# Patient Record
Sex: MALE | Race: BLACK OR AFRICAN AMERICAN | Employment: FULL TIME | URBAN - METROPOLITAN AREA
[De-identification: names, ages, dates, MRNs, and addresses within clinical notes are randomized per-mention and may not be internally consistent; named-entity substitution may affect disease eponyms.]

---

## 2024-07-21 ENCOUNTER — HOSPITAL ENCOUNTER (INPATIENT)
Facility: HOSPITAL | Age: 54
LOS: 3 days | Discharge: HOME | End: 2024-07-25
Attending: EMERGENCY MEDICINE | Admitting: HOSPITALIST

## 2024-07-21 ENCOUNTER — APPOINTMENT (OUTPATIENT)
Dept: CT IMAGING | Facility: HOSPITAL | Age: 54
End: 2024-07-21
Attending: EMERGENCY MEDICINE

## 2024-07-21 ENCOUNTER — APPOINTMENT (OUTPATIENT)
Dept: GENERAL RADIOLOGY | Facility: HOSPITAL | Age: 54
End: 2024-07-21
Attending: EMERGENCY MEDICINE

## 2024-07-21 DIAGNOSIS — I48.91 ATRIAL FIBRILLATION WITH RAPID VENTRICULAR RESPONSE (HCC): Primary | ICD-10-CM

## 2024-07-21 DIAGNOSIS — N17.9 AKI (ACUTE KIDNEY INJURY) (HCC): ICD-10-CM

## 2024-07-21 DIAGNOSIS — J90 PLEURAL EFFUSION: ICD-10-CM

## 2024-07-21 DIAGNOSIS — R79.89 ELEVATED TROPONIN: ICD-10-CM
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
## 2024-07-21 LAB
ALBUMIN SERPL-MCNC: 3.6 G/DL (ref 3.2–4.8)
ALBUMIN/GLOB SERPL: 1.2 {RATIO} (ref 1–2)
ALP LIVER SERPL-CCNC: 108 U/L
ALT SERPL-CCNC: 35 U/L
ANION GAP SERPL CALC-SCNC: 11 MMOL/L (ref 0–18)
AST SERPL-CCNC: 33 U/L (ref ?–34)
BASOPHILS # BLD AUTO: 0.05 X10(3) UL (ref 0–0.2)
BASOPHILS NFR BLD AUTO: 0.5 %
BILIRUB SERPL-MCNC: 0.5 MG/DL (ref 0.3–1.2)
BUN BLD-MCNC: 31 MG/DL (ref 9–23)
BUN/CREAT SERPL: 10.2 (ref 10–20)
CALCIUM BLD-MCNC: 8.7 MG/DL (ref 8.7–10.4)
CHLORIDE SERPL-SCNC: 112 MMOL/L (ref 98–112)
CHOLEST SERPL-MCNC: 206 MG/DL (ref ?–200)
CO2 SERPL-SCNC: 20 MMOL/L (ref 21–32)
CREAT BLD-MCNC: 3.04 MG/DL
DEPRECATED RDW RBC AUTO: 40 FL (ref 35.1–46.3)
EGFRCR SERPLBLD CKD-EPI 2021: 23 ML/MIN/1.73M2 (ref 60–?)
EOSINOPHIL # BLD AUTO: 0.06 X10(3) UL (ref 0–0.7)
EOSINOPHIL NFR BLD AUTO: 0.6 %
ERYTHROCYTE [DISTWIDTH] IN BLOOD BY AUTOMATED COUNT: 13.3 % (ref 11–15)
GLOBULIN PLAS-MCNC: 3.1 G/DL (ref 2–3.5)
GLUCOSE BLD-MCNC: 220 MG/DL (ref 70–99)
GLUCOSE BLDC GLUCOMTR-MCNC: 206 MG/DL (ref 70–99)
HCT VFR BLD AUTO: 41 %
HDLC SERPL-MCNC: 50 MG/DL (ref 40–59)
HGB BLD-MCNC: 13.2 G/DL
IMM GRANULOCYTES # BLD AUTO: 0.04 X10(3) UL (ref 0–1)
IMM GRANULOCYTES NFR BLD: 0.4 %
LDLC SERPL CALC-MCNC: 137 MG/DL (ref ?–100)
LYMPHOCYTES # BLD AUTO: 1.76 X10(3) UL (ref 1–4)
LYMPHOCYTES NFR BLD AUTO: 18.6 %
MAGNESIUM SERPL-MCNC: 1.7 MG/DL (ref 1.6–2.6)
MCH RBC QN AUTO: 26.6 PG (ref 26–34)
MCHC RBC AUTO-ENTMCNC: 32.2 G/DL (ref 31–37)
MCV RBC AUTO: 82.5 FL
MONOCYTES # BLD AUTO: 0.97 X10(3) UL (ref 0.1–1)
MONOCYTES NFR BLD AUTO: 10.2 %
NEUTROPHILS # BLD AUTO: 6.59 X10 (3) UL (ref 1.5–7.7)
NEUTROPHILS # BLD AUTO: 6.59 X10(3) UL (ref 1.5–7.7)
NEUTROPHILS NFR BLD AUTO: 69.7 %
NONHDLC SERPL-MCNC: 156 MG/DL (ref ?–130)
OSMOLALITY SERPL CALC.SUM OF ELEC: 309 MOSM/KG (ref 275–295)
PLATELET # BLD AUTO: 296 10(3)UL (ref 150–450)
POTASSIUM SERPL-SCNC: 4 MMOL/L (ref 3.5–5.1)
PROT SERPL-MCNC: 6.7 G/DL (ref 5.7–8.2)
RBC # BLD AUTO: 4.97 X10(6)UL
SODIUM SERPL-SCNC: 143 MMOL/L (ref 136–145)
TRIGL SERPL-MCNC: 107 MG/DL (ref 30–149)
TROPONIN I SERPL HS-MCNC: 510 NG/L
VLDLC SERPL CALC-MCNC: 20 MG/DL (ref 0–30)
WBC # BLD AUTO: 9.5 X10(3) UL (ref 4–11)

## 2024-07-21 PROCEDURE — 71275 CT ANGIOGRAPHY CHEST: CPT | Performed by: EMERGENCY MEDICINE

## 2024-07-21 PROCEDURE — 99223 1ST HOSP IP/OBS HIGH 75: CPT | Performed by: HOSPITALIST

## 2024-07-21 PROCEDURE — 71045 X-RAY EXAM CHEST 1 VIEW: CPT | Performed by: EMERGENCY MEDICINE

## 2024-07-21 PROCEDURE — 74175 CTA ABDOMEN W/CONTRAST: CPT | Performed by: EMERGENCY MEDICINE

## 2024-07-21 RX ORDER — DILTIAZEM HYDROCHLORIDE 5 MG/ML
INJECTION INTRAVENOUS
Status: COMPLETED
Start: 2024-07-21 | End: 2024-07-21

## 2024-07-21 RX ORDER — DILTIAZEM HYDROCHLORIDE 5 MG/ML
10 INJECTION INTRAVENOUS ONCE
Status: COMPLETED | OUTPATIENT
Start: 2024-07-21 | End: 2024-07-21

## 2024-07-22 ENCOUNTER — APPOINTMENT (OUTPATIENT)
Dept: CV DIAGNOSTICS | Facility: HOSPITAL | Age: 54
End: 2024-07-22
Attending: HOSPITALIST
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
## 2024-07-22 PROBLEM — J90 PLEURAL EFFUSION: Status: ACTIVE | Noted: 2024-07-22

## 2024-07-22 PROBLEM — N17.9 AKI (ACUTE KIDNEY INJURY) (HCC): Status: ACTIVE | Noted: 2024-07-22

## 2024-07-22 PROBLEM — R79.89 ELEVATED TROPONIN: Status: ACTIVE | Noted: 2024-07-22

## 2024-07-22 LAB
ALBUMIN SERPL-MCNC: 3.4 G/DL (ref 3.2–4.8)
ALP LIVER SERPL-CCNC: 110 U/L
ALT SERPL-CCNC: 39 U/L
ANION GAP SERPL CALC-SCNC: 6 MMOL/L (ref 0–18)
APTT PPP: 27 SECONDS (ref 23–36)
APTT PPP: 37.2 SECONDS (ref 23–36)
APTT PPP: 40.5 SECONDS (ref 23–36)
APTT PPP: 47.4 SECONDS (ref 23–36)
AST SERPL-CCNC: 32 U/L (ref ?–34)
ATRIAL RATE: 79 BPM
BASOPHILS # BLD AUTO: 0.04 X10(3) UL (ref 0–0.2)
BASOPHILS NFR BLD AUTO: 0.4 %
BILIRUB DIRECT SERPL-MCNC: 0.1 MG/DL (ref ?–0.3)
BILIRUB SERPL-MCNC: 0.4 MG/DL (ref 0.3–1.2)
BILIRUB UR QL: NEGATIVE
BNP SERPL-MCNC: 664 PG/ML
BUN BLD-MCNC: 29 MG/DL (ref 9–23)
BUN/CREAT SERPL: 9.1 (ref 10–20)
CALCIUM BLD-MCNC: 8.5 MG/DL (ref 8.7–10.4)
CHLORIDE SERPL-SCNC: 112 MMOL/L (ref 98–112)
CK SERPL-CCNC: 1122 U/L
CK SERPL-CCNC: 1405 U/L
CO2 SERPL-SCNC: 24 MMOL/L (ref 21–32)
CREAT BLD-MCNC: 3.19 MG/DL
CREAT UR-SCNC: 116.9 MG/DL
CREAT UR-SCNC: 116.9 MG/DL
DEPRECATED RDW RBC AUTO: 40.1 FL (ref 35.1–46.3)
EGFRCR SERPLBLD CKD-EPI 2021: 22 ML/MIN/1.73M2 (ref 60–?)
EOSINOPHIL # BLD AUTO: 0.02 X10(3) UL (ref 0–0.7)
EOSINOPHIL NFR BLD AUTO: 0.2 %
ERYTHROCYTE [DISTWIDTH] IN BLOOD BY AUTOMATED COUNT: 13.4 % (ref 11–15)
EST. AVERAGE GLUCOSE BLD GHB EST-MCNC: 235 MG/DL (ref 68–126)
GLUCOSE BLD-MCNC: 201 MG/DL (ref 70–99)
GLUCOSE BLDC GLUCOMTR-MCNC: 150 MG/DL (ref 70–99)
GLUCOSE BLDC GLUCOMTR-MCNC: 160 MG/DL (ref 70–99)
GLUCOSE BLDC GLUCOMTR-MCNC: 162 MG/DL (ref 70–99)
GLUCOSE BLDC GLUCOMTR-MCNC: 171 MG/DL (ref 70–99)
GLUCOSE BLDC GLUCOMTR-MCNC: 195 MG/DL (ref 70–99)
GLUCOSE BLDC GLUCOMTR-MCNC: 202 MG/DL (ref 70–99)
GLUCOSE BLDC GLUCOMTR-MCNC: 237 MG/DL (ref 70–99)
GLUCOSE UR-MCNC: 150 MG/DL
GRAN CASTS #/AREA URNS LPF: PRESENT /LPF
HBA1C MFR BLD: 9.8 % (ref ?–5.7)
HCT VFR BLD AUTO: 36 %
HGB BLD-MCNC: 11.9 G/DL
HYALINE CASTS #/AREA URNS AUTO: PRESENT /LPF
IMM GRANULOCYTES # BLD AUTO: 0.02 X10(3) UL (ref 0–1)
IMM GRANULOCYTES NFR BLD: 0.2 %
KETONES UR-MCNC: NEGATIVE MG/DL
LEUKOCYTE ESTERASE UR QL STRIP.AUTO: NEGATIVE
LYMPHOCYTES # BLD AUTO: 1.67 X10(3) UL (ref 1–4)
LYMPHOCYTES NFR BLD AUTO: 17.6 %
MCH RBC QN AUTO: 27.2 PG (ref 26–34)
MCHC RBC AUTO-ENTMCNC: 33.1 G/DL (ref 31–37)
MCV RBC AUTO: 82.2 FL
MICROALBUMIN UR-MCNC: >380 MG/DL
MONOCYTES # BLD AUTO: 1.06 X10(3) UL (ref 0.1–1)
MONOCYTES NFR BLD AUTO: 11.2 %
NEUTROPHILS # BLD AUTO: 6.68 X10 (3) UL (ref 1.5–7.7)
NEUTROPHILS # BLD AUTO: 6.68 X10(3) UL (ref 1.5–7.7)
NEUTROPHILS NFR BLD AUTO: 70.4 %
NITRITE UR QL STRIP.AUTO: NEGATIVE
OSMOLALITY SERPL CALC.SUM OF ELEC: 306 MOSM/KG (ref 275–295)
P AXIS: 62 DEGREES
P-R INTERVAL: 150 MS
PH UR: 6 [PH] (ref 5–8)
PHOSPHATE SERPL-MCNC: 4.8 MG/DL (ref 2.4–5.1)
PLATELET # BLD AUTO: 252 10(3)UL (ref 150–450)
POTASSIUM SERPL-SCNC: 4.6 MMOL/L (ref 3.5–5.1)
PROT SERPL-MCNC: 6.5 G/DL (ref 5.7–8.2)
PROT UR-MCNC: 600 MG/DL
PROT UR-MCNC: 805.8 MG/DL (ref ?–14)
PROT/CREAT UR-RTO: 6.89
Q-T INTERVAL: 320 MS
Q-T INTERVAL: 400 MS
QRS DURATION: 86 MS
QRS DURATION: 90 MS
QTC CALCULATION (BEZET): 458 MS
QTC CALCULATION (BEZET): 522 MS
R AXIS: -32 DEGREES
R AXIS: -34 DEGREES
RBC # BLD AUTO: 4.38 X10(6)UL
SODIUM SERPL-SCNC: 142 MMOL/L (ref 136–145)
SP GR UR STRIP: 1.03 (ref 1–1.03)
T AXIS: 117 DEGREES
T AXIS: 167 DEGREES
TROPONIN I SERPL HS-MCNC: 783 NG/L
TSI SER-ACNC: 4.5 MIU/ML (ref 0.55–4.78)
UROBILINOGEN UR STRIP-ACNC: NORMAL
VENTRICULAR RATE: 160 BPM
VENTRICULAR RATE: 79 BPM
WBC # BLD AUTO: 9.5 X10(3) UL (ref 4–11)

## 2024-07-22 PROCEDURE — 99255 IP/OBS CONSLTJ NEW/EST HI 80: CPT | Performed by: INTERNAL MEDICINE

## 2024-07-22 PROCEDURE — 93306 TTE W/DOPPLER COMPLETE: CPT | Performed by: HOSPITALIST

## 2024-07-22 PROCEDURE — 99233 SBSQ HOSP IP/OBS HIGH 50: CPT | Performed by: INTERNAL MEDICINE

## 2024-07-22 PROCEDURE — 99222 1ST HOSP IP/OBS MODERATE 55: CPT | Performed by: INTERNAL MEDICINE

## 2024-07-22 RX ORDER — ZOLPIDEM TARTRATE 5 MG/1
5 TABLET ORAL NIGHTLY PRN
Status: DISCONTINUED | OUTPATIENT
Start: 2024-07-22 | End: 2024-07-25

## 2024-07-22 RX ORDER — SODIUM CHLORIDE 450 MG/100ML
INJECTION, SOLUTION INTRAVENOUS CONTINUOUS
Status: DISCONTINUED | OUTPATIENT
Start: 2024-07-22 | End: 2024-07-22

## 2024-07-22 RX ORDER — DILTIAZEM HYDROCHLORIDE 5 MG/ML
10 INJECTION INTRAVENOUS ONCE
Status: COMPLETED | OUTPATIENT
Start: 2024-07-22 | End: 2024-07-22

## 2024-07-22 RX ORDER — METOPROLOL SUCCINATE 50 MG/1
50 TABLET, EXTENDED RELEASE ORAL
Status: DISCONTINUED | OUTPATIENT
Start: 2024-07-22 | End: 2024-07-25

## 2024-07-22 RX ORDER — INSULIN DEGLUDEC 100 U/ML
10 INJECTION, SOLUTION SUBCUTANEOUS DAILY
Status: DISCONTINUED | OUTPATIENT
Start: 2024-07-22 | End: 2024-07-22

## 2024-07-22 RX ORDER — NICOTINE POLACRILEX 4 MG
30 LOZENGE BUCCAL
Status: DISCONTINUED | OUTPATIENT
Start: 2024-07-22 | End: 2024-07-25

## 2024-07-22 RX ORDER — HEPARIN SODIUM 1000 [USP'U]/ML
3000 INJECTION, SOLUTION INTRAVENOUS; SUBCUTANEOUS ONCE
Status: COMPLETED | OUTPATIENT
Start: 2024-07-22 | End: 2024-07-22

## 2024-07-22 RX ORDER — MORPHINE SULFATE 2 MG/ML
2 INJECTION, SOLUTION INTRAMUSCULAR; INTRAVENOUS EVERY 2 HOUR PRN
Status: DISCONTINUED | OUTPATIENT
Start: 2024-07-22 | End: 2024-07-25

## 2024-07-22 RX ORDER — HEPARIN SODIUM AND DEXTROSE 10000; 5 [USP'U]/100ML; G/100ML
1000 INJECTION INTRAVENOUS ONCE
Status: COMPLETED | OUTPATIENT
Start: 2024-07-22 | End: 2024-07-22

## 2024-07-22 RX ORDER — PANTOPRAZOLE SODIUM 40 MG/1
40 TABLET, DELAYED RELEASE ORAL
Status: DISCONTINUED | OUTPATIENT
Start: 2024-07-22 | End: 2024-07-25

## 2024-07-22 RX ORDER — LISINOPRIL 20 MG/1
20 TABLET ORAL DAILY
COMMUNITY
End: 2024-07-25

## 2024-07-22 RX ORDER — ACETAMINOPHEN 325 MG/1
650 TABLET ORAL EVERY 6 HOURS PRN
Status: DISCONTINUED | OUTPATIENT
Start: 2024-07-22 | End: 2024-07-22

## 2024-07-22 RX ORDER — ONDANSETRON 2 MG/ML
4 INJECTION INTRAMUSCULAR; INTRAVENOUS EVERY 6 HOURS PRN
Status: DISCONTINUED | OUTPATIENT
Start: 2024-07-22 | End: 2024-07-25

## 2024-07-22 RX ORDER — HEPARIN SODIUM AND DEXTROSE 10000; 5 [USP'U]/100ML; G/100ML
INJECTION INTRAVENOUS CONTINUOUS
Status: DISCONTINUED | OUTPATIENT
Start: 2024-07-22 | End: 2024-07-25

## 2024-07-22 RX ORDER — HYDRALAZINE HYDROCHLORIDE 20 MG/ML
10 INJECTION INTRAMUSCULAR; INTRAVENOUS EVERY 4 HOURS PRN
Status: DISCONTINUED | OUTPATIENT
Start: 2024-07-22 | End: 2024-07-25

## 2024-07-22 RX ORDER — METFORMIN HYDROCHLORIDE EXTENDED-RELEASE TABLETS 500 MG/1
500 TABLET, FILM COATED, EXTENDED RELEASE ORAL 2 TIMES DAILY WITH MEALS
COMMUNITY
End: 2024-07-25

## 2024-07-22 RX ORDER — MAGNESIUM OXIDE 400 MG/1
400 TABLET ORAL ONCE
Status: COMPLETED | OUTPATIENT
Start: 2024-07-22 | End: 2024-07-22

## 2024-07-22 RX ORDER — ONDANSETRON 2 MG/ML
4 INJECTION INTRAMUSCULAR; INTRAVENOUS ONCE
Status: COMPLETED | OUTPATIENT
Start: 2024-07-22 | End: 2024-07-22

## 2024-07-22 RX ORDER — MORPHINE SULFATE 4 MG/ML
4 INJECTION, SOLUTION INTRAMUSCULAR; INTRAVENOUS EVERY 2 HOUR PRN
Status: DISCONTINUED | OUTPATIENT
Start: 2024-07-22 | End: 2024-07-25

## 2024-07-22 RX ORDER — FUROSEMIDE 10 MG/ML
40 INJECTION INTRAMUSCULAR; INTRAVENOUS ONCE
Status: COMPLETED | OUTPATIENT
Start: 2024-07-22 | End: 2024-07-22

## 2024-07-22 RX ORDER — DEXTROSE MONOHYDRATE 25 G/50ML
50 INJECTION, SOLUTION INTRAVENOUS
Status: DISCONTINUED | OUTPATIENT
Start: 2024-07-22 | End: 2024-07-25

## 2024-07-22 RX ORDER — HEPARIN SODIUM 1000 [USP'U]/ML
5000 INJECTION, SOLUTION INTRAVENOUS; SUBCUTANEOUS ONCE
Status: COMPLETED | OUTPATIENT
Start: 2024-07-22 | End: 2024-07-22

## 2024-07-22 RX ORDER — ACETAMINOPHEN 500 MG
1000 TABLET ORAL ONCE
Status: COMPLETED | OUTPATIENT
Start: 2024-07-22 | End: 2024-07-22

## 2024-07-22 RX ORDER — NICOTINE POLACRILEX 4 MG
15 LOZENGE BUCCAL
Status: DISCONTINUED | OUTPATIENT
Start: 2024-07-22 | End: 2024-07-25

## 2024-07-22 RX ORDER — MAGNESIUM HYDROXIDE/ALUMINUM HYDROXICE/SIMETHICONE 120; 1200; 1200 MG/30ML; MG/30ML; MG/30ML
30 SUSPENSION ORAL 4 TIMES DAILY PRN
Status: DISCONTINUED | OUTPATIENT
Start: 2024-07-22 | End: 2024-07-25

## 2024-07-22 RX ORDER — LABETALOL HYDROCHLORIDE 5 MG/ML
10 INJECTION, SOLUTION INTRAVENOUS EVERY 4 HOURS PRN
Status: DISCONTINUED | OUTPATIENT
Start: 2024-07-22 | End: 2024-07-25

## 2024-07-22 RX ORDER — HYDRALAZINE HYDROCHLORIDE 20 MG/ML
10 INJECTION INTRAMUSCULAR; INTRAVENOUS ONCE
Status: COMPLETED | OUTPATIENT
Start: 2024-07-22 | End: 2024-07-22

## 2024-07-22 RX ORDER — HYDROCODONE BITARTRATE AND ACETAMINOPHEN 5; 325 MG/1; MG/1
1 TABLET ORAL EVERY 6 HOURS PRN
Status: DISCONTINUED | OUTPATIENT
Start: 2024-07-22 | End: 2024-07-25

## 2024-07-22 RX ORDER — ONDANSETRON 2 MG/ML
INJECTION INTRAMUSCULAR; INTRAVENOUS
Status: COMPLETED
Start: 2024-07-22 | End: 2024-07-22

## 2024-07-22 RX ORDER — INSULIN DEGLUDEC 100 U/ML
15 INJECTION, SOLUTION SUBCUTANEOUS DAILY
Status: DISCONTINUED | OUTPATIENT
Start: 2024-07-23 | End: 2024-07-25
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
## 2024-07-22 NOTE — PLAN OF CARE
Patient alert & oriented x4. Patient NSR on monitor. Heparin drip infusing. Family at bedside. Patient and family updated on POC and verbalized understanding.     Problem: Patient Centered Care  Goal: Patient preferences are identified and integrated in the patient's plan of care  Description: Interventions:  - What would you like us to know as we care for you? Im from home   - Provide timely, complete, and accurate information to patient/family  - Incorporate patient and family knowledge, values, beliefs, and cultural backgrounds into the planning and delivery of care  - Encourage patient/family to participate in care and decision-making at the level they choose  - Honor patient and family perspectives and choices  Outcome: Progressing     Problem: Patient/Family Goals  Goal: Patient/Family Long Term Goal  Description: Patient's Long Term Goal: To go home    Interventions:  -Cardiology, nephrology, and endocrinology consults  - Further testing  - Follow providers recommendations  - See additional Care Plan goals for specific interventions  Outcome: Progressing  Goal: Patient/Family Short Term Goal  Description: Patient's Short Term Goal: Keep my heart in regular rhythm    Interventions:   - Cardiology consult  - Further testing  - Follow provider recommendations  - See additional Care Plan goals for specific interventions  Outcome: Progressing     Problem: CARDIOVASCULAR - ADULT  Goal: Maintains optimal cardiac output and hemodynamic stability  Description: INTERVENTIONS:  - Monitor vital signs, rhythm, and trends  - Monitor for bleeding, hypotension and signs of decreased cardiac output  - Evaluate effectiveness of vasoactive medications to optimize hemodynamic stability  - Monitor arterial and/or venous puncture sites for bleeding and/or hematoma  - Assess quality of pulses, skin color and temperature  - Assess for signs of decreased coronary artery perfusion - ex. Angina  - Evaluate fluid balance, assess for  edema, trend weights  Outcome: Progressing  Goal: Absence of cardiac arrhythmias or at baseline  Description: INTERVENTIONS:  - Continuous cardiac monitoring, monitor vital signs, obtain 12 lead EKG if indicated  - Evaluate effectiveness of antiarrhythmic and heart rate control medications as ordered  - Initiate emergency measures for life threatening arrhythmias  - Monitor electrolytes and administer replacement therapy as ordered  Outcome: Progressing
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
## 2024-07-22 NOTE — ED QUICK NOTES
Orders for admission, patient is aware of plan and ready to go upstairs. Any questions, please call ED MURPHY Banegas at extension [phone].     Patient Covid vaccination status: Unvaccinated     COVID Test Ordered in ED: None    COVID Suspicion at Admission: N/A    Running Infusions:      Mental Status/LOC at time of transport: axox4    Other pertinent information:   CIWA score: N/A   NIH score:  N/A

## 2024-07-22 NOTE — PLAN OF CARE
Handoff from Lelia ARRINGTON. Pt reporting left side chest cramping again. MD notified. No EKG per pcp. Prn morphine given. Urine sample collected. Plan for US kidney. Family reached out for financial support with Medicaid and work letter. SW consulted. Call light within reach and safety precautions in place.
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
## 2024-07-22 NOTE — PLAN OF CARE
Problem: Patient Centered Care  Goal: Patient preferences are identified and integrated in the patient's plan of care  Description: Interventions:  - What would you like us to know as we care for you? Im from home   - Provide timely, complete, and accurate information to patient/family  - Incorporate patient and family knowledge, values, beliefs, and cultural backgrounds into the planning and delivery of care  - Encourage patient/family to participate in care and decision-making at the level they choose  - Honor patient and family perspectives and choices  Outcome: Progressing     Problem: Patient/Family Goals  Goal: Patient/Family Long Term Goal  Description: Patient's Long Term Goal: Get stronger     Interventions:  - See additional Care Plan goals for specific interventions  Outcome: Progressing  Goal: Patient/Family Short Term Goal  Description: Patient's Short Term Goal: Go home     Interventions:   - See additional Care Plan goals for specific interventions  Outcome: Progressing     Problem: CARDIOVASCULAR - ADULT  Goal: Maintains optimal cardiac output and hemodynamic stability  Description: INTERVENTIONS:  - Monitor vital signs, rhythm, and trends  - Monitor for bleeding, hypotension and signs of decreased cardiac output  - Evaluate effectiveness of vasoactive medications to optimize hemodynamic stability  - Monitor arterial and/or venous puncture sites for bleeding and/or hematoma  - Assess quality of pulses, skin color and temperature  - Assess for signs of decreased coronary artery perfusion - ex. Angina  - Evaluate fluid balance, assess for edema, trend weights  Outcome: Progressing  Goal: Absence of cardiac arrhythmias or at baseline  Description: INTERVENTIONS:  - Continuous cardiac monitoring, monitor vital signs, obtain 12 lead EKG if indicated  - Evaluate effectiveness of antiarrhythmic and heart rate control medications as ordered  - Initiate emergency measures for life threatening arrhythmias  -  Monitor electrolytes and administer replacement therapy as ordered  Outcome: Progressing

## 2024-07-23 ENCOUNTER — APPOINTMENT (OUTPATIENT)
Dept: ULTRASOUND IMAGING | Facility: HOSPITAL | Age: 54
End: 2024-07-23
Attending: INTERNAL MEDICINE

## 2024-07-23 LAB
ALBUMIN SERPL-MCNC: 3.1 G/DL (ref 3.2–4.8)
ANION GAP SERPL CALC-SCNC: 5 MMOL/L (ref 0–18)
APTT PPP: 56.9 SECONDS (ref 23–36)
BASOPHILS # BLD AUTO: 0.03 X10(3) UL (ref 0–0.2)
BASOPHILS NFR BLD AUTO: 0.3 %
BUN BLD-MCNC: 36 MG/DL (ref 9–23)
BUN/CREAT SERPL: 9.8 (ref 10–20)
CALCIUM BLD-MCNC: 8.7 MG/DL (ref 8.7–10.4)
CHLORIDE SERPL-SCNC: 109 MMOL/L (ref 98–112)
CK SERPL-CCNC: 862 U/L
CO2 SERPL-SCNC: 24 MMOL/L (ref 21–32)
CREAT BLD-MCNC: 3.66 MG/DL
DEPRECATED RDW RBC AUTO: 41 FL (ref 35.1–46.3)
EGFRCR SERPLBLD CKD-EPI 2021: 19 ML/MIN/1.73M2 (ref 60–?)
EOSINOPHIL # BLD AUTO: 0.13 X10(3) UL (ref 0–0.7)
EOSINOPHIL NFR BLD AUTO: 1.4 %
ERYTHROCYTE [DISTWIDTH] IN BLOOD BY AUTOMATED COUNT: 14 % (ref 11–15)
EST. AVERAGE GLUCOSE BLD GHB EST-MCNC: 237 MG/DL (ref 68–126)
GLUCOSE BLD-MCNC: 181 MG/DL (ref 70–99)
GLUCOSE BLDC GLUCOMTR-MCNC: 119 MG/DL (ref 70–99)
GLUCOSE BLDC GLUCOMTR-MCNC: 121 MG/DL (ref 70–99)
GLUCOSE BLDC GLUCOMTR-MCNC: 122 MG/DL (ref 70–99)
GLUCOSE BLDC GLUCOMTR-MCNC: 144 MG/DL (ref 70–99)
HBA1C MFR BLD: 9.9 % (ref ?–5.7)
HCT VFR BLD AUTO: 35.2 %
HGB BLD-MCNC: 11.3 G/DL
IMM GRANULOCYTES # BLD AUTO: 0.02 X10(3) UL (ref 0–1)
IMM GRANULOCYTES NFR BLD: 0.2 %
LYMPHOCYTES # BLD AUTO: 2.69 X10(3) UL (ref 1–4)
LYMPHOCYTES NFR BLD AUTO: 29.8 %
MAGNESIUM SERPL-MCNC: 1.9 MG/DL (ref 1.6–2.6)
MCH RBC QN AUTO: 26.4 PG (ref 26–34)
MCHC RBC AUTO-ENTMCNC: 32.1 G/DL (ref 31–37)
MCV RBC AUTO: 82.2 FL
MONOCYTES # BLD AUTO: 1.09 X10(3) UL (ref 0.1–1)
MONOCYTES NFR BLD AUTO: 12.1 %
NEUTROPHILS # BLD AUTO: 5.08 X10 (3) UL (ref 1.5–7.7)
NEUTROPHILS # BLD AUTO: 5.08 X10(3) UL (ref 1.5–7.7)
NEUTROPHILS NFR BLD AUTO: 56.2 %
OSMOLALITY SERPL CALC.SUM OF ELEC: 299 MOSM/KG (ref 275–295)
PHOSPHATE SERPL-MCNC: 5.4 MG/DL (ref 2.4–5.1)
PLATELET # BLD AUTO: 252 10(3)UL (ref 150–450)
POTASSIUM SERPL-SCNC: 4.4 MMOL/L (ref 3.5–5.1)
RBC # BLD AUTO: 4.28 X10(6)UL
SODIUM SERPL-SCNC: 138 MMOL/L (ref 136–145)
WBC # BLD AUTO: 9 X10(3) UL (ref 4–11)

## 2024-07-23 PROCEDURE — 99233 SBSQ HOSP IP/OBS HIGH 50: CPT | Performed by: INTERNAL MEDICINE

## 2024-07-23 PROCEDURE — 76770 US EXAM ABDO BACK WALL COMP: CPT | Performed by: INTERNAL MEDICINE

## 2024-07-23 PROCEDURE — 99233 SBSQ HOSP IP/OBS HIGH 50: CPT | Performed by: HOSPITALIST

## 2024-07-23 NOTE — PLAN OF CARE
Problem: Patient Centered Care  Goal: Patient preferences are identified and integrated in the patient's plan of care  Description: Interventions:  - What would you like us to know as we care for you? Im from home   - Provide timely, complete, and accurate information to patient/family  - Incorporate patient and family knowledge, values, beliefs, and cultural backgrounds into the planning and delivery of care  - Encourage patient/family to participate in care and decision-making at the level they choose  - Honor patient and family perspectives and choices  Outcome: Progressing     Problem: Patient/Family Goals  Goal: Patient/Family Long Term Goal  Description: Patient's Long Term Goal: To go home    Interventions:  -Cardiology, nephrology, and endocrinology consults  - Further testing  - Follow providers recommendations  - See additional Care Plan goals for specific interventions  Outcome: Progressing  Goal: Patient/Family Short Term Goal  Description: Patient's Short Term Goal: Keep my heart in regular rhythm    Interventions:   - Cardiology consult  - Further testing  - Follow provider recommendations  - See additional Care Plan goals for specific interventions  Outcome: Progressing     Problem: CARDIOVASCULAR - ADULT  Goal: Maintains optimal cardiac output and hemodynamic stability  Description: INTERVENTIONS:  - Monitor vital signs, rhythm, and trends  - Monitor for bleeding, hypotension and signs of decreased cardiac output  - Evaluate effectiveness of vasoactive medications to optimize hemodynamic stability  - Monitor arterial and/or venous puncture sites for bleeding and/or hematoma  - Assess quality of pulses, skin color and temperature  - Assess for signs of decreased coronary artery perfusion - ex. Angina  - Evaluate fluid balance, assess for edema, trend weights  Outcome: Progressing  Goal: Absence of cardiac arrhythmias or at baseline  Description: INTERVENTIONS:  - Continuous cardiac monitoring,  monitor vital signs, obtain 12 lead EKG if indicated  - Evaluate effectiveness of antiarrhythmic and heart rate control medications as ordered  - Initiate emergency measures for life threatening arrhythmias  - Monitor electrolytes and administer replacement therapy as ordered  Outcome: Progressing

## 2024-07-23 NOTE — PROGRESS NOTES
Endocrine Note    Reviewed BG levels from the past 24 hours which are at goal.  Will continue current dose of Tresiba and Novolog.  Will follow.
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
## 2024-07-23 NOTE — CM/SW NOTE
07/23/24 1000   CM/SW Referral Data   Referral Source Social Work (self-referral)   Reason for Referral Financial issues   Informant Patient;Daughter   Medical Hx   Does patient have an established PCP? No   Significant Past Medical/Mental Health Hx HTN, DM2   Patient Info   Patient's Current Mental Status at Time of Assessment Oriented;Alert   Patient's Home Environment Condo/Apt no elevator   Number of Levels in Home 1   Patient lives with Spouse/Significant other   Patient Status Prior to Admission   Independent with ADLs and Mobility Yes   Discharge Needs   Anticipated D/C needs No anticipated discharge needs     Social work received a consult for financial support.    The patient does not have any health insurance but works full time and is .  The patient will be over income for Medicaid.    Social work provided the patient with VNA clinic and Access Clinic resources.  The patient will be able to gain a PCP and Medications on an income based sliding scale.  Social work advised the patient to make an appt as soon as possible.    The patient and daughter were appreciative of the resources.    The patient and family has no further questions or concerns at this time.    SW/CM to remain available for support and/or discharge planning.     Viviane Lewis MSW, LSW  Discharge Planner [id number]

## 2024-07-23 NOTE — PLAN OF CARE
[place] is AOX4, heparin drip therapeutic during day. Call light within reach, frequent rounding by staff. Family at bedside.     Problem: Patient Centered Care  Goal: Patient preferences are identified and integrated in the patient's plan of care  Description: Interventions:  - What would you like us to know as we care for you? Im from home   - Provide timely, complete, and accurate information to patient/family  - Incorporate patient and family knowledge, values, beliefs, and cultural backgrounds into the planning and delivery of care  - Encourage patient/family to participate in care and decision-making at the level they choose  - Honor patient and family perspectives and choices  Outcome: Progressing     Problem: Patient/Family Goals  Goal: Patient/Family Long Term Goal  Description: Patient's Long Term Goal: To go home    Interventions:  -Cardiology, nephrology, and endocrinology consults  - Further testing  - Follow providers recommendations  - See additional Care Plan goals for specific interventions  Outcome: Progressing  Goal: Patient/Family Short Term Goal  Description: Patient's Short Term Goal: Keep my heart in regular rhythm    Interventions:   - Cardiology consult  - Further testing  - Follow provider recommendations  - See additional Care Plan goals for specific interventions  Outcome: Progressing     Problem: CARDIOVASCULAR - ADULT  Goal: Maintains optimal cardiac output and hemodynamic stability  Description: INTERVENTIONS:  - Monitor vital signs, rhythm, and trends  - Monitor for bleeding, hypotension and signs of decreased cardiac output  - Evaluate effectiveness of vasoactive medications to optimize hemodynamic stability  - Monitor arterial and/or venous puncture sites for bleeding and/or hematoma  - Assess quality of pulses, skin color and temperature  - Assess for signs of decreased coronary artery perfusion - ex. Angina  - Evaluate fluid balance, assess for edema, trend weights  Outcome:  Progressing  Goal: Absence of cardiac arrhythmias or at baseline  Description: INTERVENTIONS:  - Continuous cardiac monitoring, monitor vital signs, obtain 12 lead EKG if indicated  - Evaluate effectiveness of antiarrhythmic and heart rate control medications as ordered  - Initiate emergency measures for life threatening arrhythmias  - Monitor electrolytes and administer replacement therapy as ordered  Outcome: Progressing

## 2024-07-24 LAB
ALBUMIN SERPL-MCNC: 3.2 G/DL (ref 3.2–4.8)
ANION GAP SERPL CALC-SCNC: 9 MMOL/L (ref 0–18)
APTT PPP: 70.5 SECONDS (ref 23–36)
BASOPHILS # BLD AUTO: 0.03 X10(3) UL (ref 0–0.2)
BASOPHILS NFR BLD AUTO: 0.4 %
BUN BLD-MCNC: 37 MG/DL (ref 9–23)
BUN/CREAT SERPL: 10.5 (ref 10–20)
CALCIUM BLD-MCNC: 8.4 MG/DL (ref 8.7–10.4)
CHLORIDE SERPL-SCNC: 111 MMOL/L (ref 98–112)
CO2 SERPL-SCNC: 20 MMOL/L (ref 21–32)
CREAT BLD-MCNC: 3.51 MG/DL
DEPRECATED RDW RBC AUTO: 41 FL (ref 35.1–46.3)
EGFRCR SERPLBLD CKD-EPI 2021: 20 ML/MIN/1.73M2 (ref 60–?)
EOSINOPHIL # BLD AUTO: 0.13 X10(3) UL (ref 0–0.7)
EOSINOPHIL NFR BLD AUTO: 1.5 %
ERYTHROCYTE [DISTWIDTH] IN BLOOD BY AUTOMATED COUNT: 13.7 % (ref 11–15)
GLUCOSE BLD-MCNC: 111 MG/DL (ref 70–99)
GLUCOSE BLDC GLUCOMTR-MCNC: 121 MG/DL (ref 70–99)
GLUCOSE BLDC GLUCOMTR-MCNC: 125 MG/DL (ref 70–99)
GLUCOSE BLDC GLUCOMTR-MCNC: 129 MG/DL (ref 70–99)
GLUCOSE BLDC GLUCOMTR-MCNC: 98 MG/DL (ref 70–99)
HCT VFR BLD AUTO: 36.2 %
HGB BLD-MCNC: 11.8 G/DL
IMM GRANULOCYTES # BLD AUTO: 0.03 X10(3) UL (ref 0–1)
IMM GRANULOCYTES NFR BLD: 0.4 %
LYMPHOCYTES # BLD AUTO: 2.11 X10(3) UL (ref 1–4)
LYMPHOCYTES NFR BLD AUTO: 25.1 %
MAGNESIUM SERPL-MCNC: 1.9 MG/DL (ref 1.6–2.6)
MCH RBC QN AUTO: 26.8 PG (ref 26–34)
MCHC RBC AUTO-ENTMCNC: 32.6 G/DL (ref 31–37)
MCV RBC AUTO: 82.1 FL
MONOCYTES # BLD AUTO: 0.95 X10(3) UL (ref 0.1–1)
MONOCYTES NFR BLD AUTO: 11.3 %
NEUTROPHILS # BLD AUTO: 5.15 X10 (3) UL (ref 1.5–7.7)
NEUTROPHILS # BLD AUTO: 5.15 X10(3) UL (ref 1.5–7.7)
NEUTROPHILS NFR BLD AUTO: 61.3 %
OSMOLALITY SERPL CALC.SUM OF ELEC: 299 MOSM/KG (ref 275–295)
PHOSPHATE SERPL-MCNC: 4.8 MG/DL (ref 2.4–5.1)
PLATELET # BLD AUTO: 225 10(3)UL (ref 150–450)
POTASSIUM SERPL-SCNC: 4.2 MMOL/L (ref 3.5–5.1)
RBC # BLD AUTO: 4.41 X10(6)UL
SODIUM SERPL-SCNC: 140 MMOL/L (ref 136–145)
WBC # BLD AUTO: 8.4 X10(3) UL (ref 4–11)

## 2024-07-24 PROCEDURE — 99233 SBSQ HOSP IP/OBS HIGH 50: CPT | Performed by: HOSPITALIST

## 2024-07-24 PROCEDURE — 99232 SBSQ HOSP IP/OBS MODERATE 35: CPT | Performed by: INTERNAL MEDICINE

## 2024-07-24 RX ORDER — AMLODIPINE BESYLATE 5 MG/1
5 TABLET ORAL DAILY
Status: DISCONTINUED | OUTPATIENT
Start: 2024-07-24 | End: 2024-07-25
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
## 2024-07-24 NOTE — PLAN OF CARE
Sj Simmons is A&O x 4 with some forgetfulness.  Lives at home with wife.    Stand-by assist with ambulating.  Continent of bowel and bladder.  VSS:  stable.  Denies pain.  Plan:  stress test tomorrow morning, heparin gtt, manage BP  Will continue to monitor and treat.    Problem: Patient Centered Care  Goal: Patient preferences are identified and integrated in the patient's plan of care  Description: Interventions:  - What would you like us to know as we care for you? I have 5 daughters and one sone  - Provide timely, complete, and accurate information to patient/family  - Incorporate patient and family knowledge, values, beliefs, and cultural backgrounds into the planning and delivery of care  - Encourage patient/family to participate in care and decision-making at the level they choose  - Honor patient and family perspectives and choices  Outcome: Progressing     Problem: Patient/Family Goals  Goal: Patient/Family Long Term Goal  Description: Patient's Long Term Goal: To go home    Interventions:  -Cardiology, nephrology, and endocrinology consults  - Further testing  - Follow providers recommendations  - See additional Care Plan goals for specific interventions  Outcome: Progressing  Goal: Patient/Family Short Term Goal  Description: Patient's Short Term Goal: Keep my heart in regular rhythm    Interventions:   - Cardiology consult  - Further testing  - Follow provider recommendations  - See additional Care Plan goals for specific interventions  Outcome: Progressing     Problem: CARDIOVASCULAR - ADULT  Goal: Maintains optimal cardiac output and hemodynamic stability  Description: INTERVENTIONS:  - Monitor vital signs, rhythm, and trends  - Monitor for bleeding, hypotension and signs of decreased cardiac output  - Evaluate effectiveness of vasoactive medications to optimize hemodynamic stability  - Monitor arterial and/or venous puncture sites for bleeding and/or hematoma  - Assess quality of pulses, skin  color and temperature  - Assess for signs of decreased coronary artery perfusion - ex. Angina  - Evaluate fluid balance, assess for edema, trend weights  Outcome: Progressing  Goal: Absence of cardiac arrhythmias or at baseline  Description: INTERVENTIONS:  - Continuous cardiac monitoring, monitor vital signs, obtain 12 lead EKG if indicated  - Evaluate effectiveness of antiarrhythmic and heart rate control medications as ordered  - Initiate emergency measures for life threatening arrhythmias  - Monitor electrolytes and administer replacement therapy as ordered  Outcome: Progressing     Problem: GASTROINTESTINAL - ADULT  Goal: Maintains or returns to baseline bowel function  Description: INTERVENTIONS:  - Assess bowel function  - Maintain adequate hydration with IV or PO as ordered and tolerated  - Evaluate effectiveness of GI medications  - Encourage mobilization and activity  - Obtain nutritional consult as needed  - Establish a toileting routine/schedule  - Consider collaborating with pharmacy to review patient's medication profile  Outcome: Progressing     Problem: METABOLIC/FLUID AND ELECTROLYTES - ADULT  Goal: Glucose maintained within prescribed range  Description: INTERVENTIONS:  - Monitor Blood Glucose as ordered  - Assess for signs and symptoms of hyperglycemia and hypoglycemia  - Administer ordered medications to maintain glucose within target range  - Assess barriers to adequate nutritional intake and initiate nutrition consult as needed  - Instruct patient on self management of diabetes  Outcome: Progressing  Goal: Electrolytes maintained within normal limits  Description: INTERVENTIONS:  - Monitor labs and rhythm and assess patient for signs and symptoms of electrolyte imbalances  - Administer electrolyte replacement as ordered  - Monitor response to electrolyte replacements, including rhythm and repeat lab results as appropriate  - Fluid restriction as ordered  - Instruct patient on fluid and  nutrition restrictions as appropriate  Outcome: Progressing     Problem: HEMATOLOGIC - ADULT  Goal: Maintains hematologic stability  Description: INTERVENTIONS  - Assess for signs and symptoms of bleeding or hemorrhage  - Monitor labs and vital signs for trends  - Administer supportive blood products/factors, fluids and medications as ordered and appropriate  - Administer supportive blood products/factors as ordered and appropriate  Outcome: Progressing  Goal: Free from bleeding injury  Description: (Example usage: patient with low platelets)  INTERVENTIONS:  - Avoid intramuscular injections, enemas and rectal medication administration  - Ensure safe mobilization of patient  - Hold pressure on venipuncture sites to achieve adequate hemostasis  - Assess for signs and symptoms of internal bleeding  - Monitor lab trends  - Patient is to report abnormal signs of bleeding to staff  - Avoid use of toothpicks and dental floss  - Use electric shaver for shaving  - Use soft bristle tooth brush  - Limit straining and forceful nose blowing  Outcome: Progressing

## 2024-07-24 NOTE — PROGRESS NOTES
Progress Note  Sj Simmons Sr. Patient Status:  Inpatient    3/11/1970 MRN [id number]   Location [facility] 3W/SW Attending John Fish MD   Hosp Day # 2 PCP No primary care provider on file.     Subjective:  No complaints of chest pain or SOB    Objective:  BP (!) 167/90 (BP Location: Right arm)   Pulse 62   Temp 98.2 °F (36.8 °C) (Oral)   Resp 17   Wt 269 lb (122 kg)   SpO2 95%     Telemetry: sr      Intake/Output:+1230        Last 3 Weights   24 0400 269 lb (122 kg)   24 0459 269 lb 6.4 oz (122.2 kg)   24 0243 266 lb (120.7 kg)   24 0028 269 lb 10 oz (122.3 kg)   24 0027 269 lb 6.4 oz (122.2 kg)       Labs:  Recent Labs   Lab 24  2212 24  0654 24  0434 24  0609   * 201* 181* 111*   BUN 31* 29* 36* 37*   CREATSERUM 3.04* 3.19* 3.66* 3.51*   EGFRCR 23* 22* 19* 20*   CA 8.7 8.5* 8.7 8.4*   ALB 3.4  3.6  --  3.1* 3.2    142 138 140   K 4.0 4.6 4.4 4.2    112 109 111   CO2 20.0* 24.0 24.0 20.0*   ALKPHO 110  108  --   --   --    AST 32  33  --   --   --    ALT 39  35  --   --   --    BILT 0.4  0.5  --   --   --    TP 6.5  6.7  --   --   --      Recent Labs   Lab 24  0653 24  0434 24  0609   RBC 4.38 4.28* 4.41   HGB 11.9* 11.3* 11.8*   HCT 36.0* 35.2* 36.2*   MCV 82.2 82.2 82.1   MCH 27.2 26.4 26.8   MCHC 33.1 32.1 32.6   RDW 13.4 14.0 13.7   NEPRELIM 6.68 5.08 5.15   WBC 9.5 9.0 8.4   .0 252.0 225.0         Recent Labs   Lab 24  2212 24  0654 24  0434   TROPHS 510* 783*  --    CK 1,405* 1,122* 862*     No results found for: \"PT\", \"INR\"    Diagnostics:     Review of Systems   Constitutional: Negative.   Cardiovascular: Negative.    Respiratory: Negative.         Physical Exam:    Gen: Alert, oriented x 3, in no apparent distress  Heent: Pupils equal, reactive. Mucous membranes moist.   Cardiac: Regular rate and rhythm, normal S1,S2  Lungs: Clear to auscultation  Abd:  Soft, non tender, non distended  Ext: No edema  Skin: Warm, dry          Medications:     insulin aspart  2 Units Subcutaneous Once    pantoprazole  40 mg Oral QAM AC    insulin degludec  15 Units Subcutaneous Daily    metoprolol succinate  50 mg Oral 2x Daily(Beta Blocker)    insulin aspart  1-7 Units Subcutaneous TID CC    insulin aspart  5 Units Subcutaneous TID CC      continuous dose heparin 1,600 Units/hr (07/24/24 0453)           Assessment:  NSTEMI  Trop 1425 >1122 >862  Echo with LVEF 55-60%, mild MR  On IV heparin  On BB  Paroxysmal atrial fibrillation   Currently in SR  On BB  Chadsvasc3 on IV heparin gtt  BRUNA   Creatinine 3.51 today  US kidney pending  Per nephrology  HTN - 167/90  On BB  Lisinopril on hold  Type II DM - HgbA1c 9.8    Plan:  Will need ischemic evaluation when creatinine improved  Continue IV heparin . Will transition to DOAC when able  Continue BB    Plan of care discussed with patient, RN.    DYANA Cervantes  7/24/2024  11:35 AM    Echo:   Conclusions:     1. Left ventricle: The cavity size was normal. Wall thickness was mildly      increased. Systolic function was normal. The estimated ejection fraction      was 55-60%, by visual assessment. Doppler parameters are consistent with      abnormal left ventricular relaxation - grade 1 diastolic dysfunction.   2. Left atrium: The atrium was increased in size. The left atrial volume was      mildly increased.   3. Mitral valve: There was mild regurgitation.   4. Pulmonary arteries: Systolic pressure was within the normal range,      estimated to be 23mm Hg.   Impressions:  No previous study was available for comparison       D/W Dr. Pizarro. Will order stress test Heena Nuc for tomorrow.   3:28 PM
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
## 2024-07-25 ENCOUNTER — TELEPHONE (OUTPATIENT)
Dept: NEPHROLOGY | Facility: CLINIC | Age: 54
End: 2024-07-25

## 2024-07-25 ENCOUNTER — TELEPHONE (OUTPATIENT)
Dept: ENDOCRINOLOGY CLINIC | Facility: CLINIC | Age: 54
End: 2024-07-25

## 2024-07-25 ENCOUNTER — APPOINTMENT (OUTPATIENT)
Dept: NUCLEAR MEDICINE | Facility: HOSPITAL | Age: 54
End: 2024-07-25
Attending: INTERNAL MEDICINE

## 2024-07-25 ENCOUNTER — APPOINTMENT (OUTPATIENT)
Dept: CV DIAGNOSTICS | Facility: HOSPITAL | Age: 54
End: 2024-07-25
Attending: INTERNAL MEDICINE

## 2024-07-25 VITALS
WEIGHT: 266.81 LBS | SYSTOLIC BLOOD PRESSURE: 160 MMHG | OXYGEN SATURATION: 97 % | TEMPERATURE: 98 F | HEART RATE: 62 BPM | DIASTOLIC BLOOD PRESSURE: 88 MMHG | RESPIRATION RATE: 18 BRPM

## 2024-07-25 DIAGNOSIS — N17.9 AKI (ACUTE KIDNEY INJURY) (HCC): Primary | ICD-10-CM

## 2024-07-25 LAB
% OF MAX PREDICTED HR: 100 %
ALBUMIN SERPL-MCNC: 3.3 G/DL (ref 3.2–4.8)
ANION GAP SERPL CALC-SCNC: 8 MMOL/L (ref 0–18)
APTT PPP: 71.1 SECONDS (ref 23–36)
BASOPHILS # BLD AUTO: 0.04 X10(3) UL (ref 0–0.2)
BASOPHILS NFR BLD AUTO: 0.5 %
BUN BLD-MCNC: 34 MG/DL (ref 9–23)
BUN/CREAT SERPL: 10.6 (ref 10–20)
CALCIUM BLD-MCNC: 8.5 MG/DL (ref 8.7–10.4)
CHLORIDE SERPL-SCNC: 111 MMOL/L (ref 98–112)
CO2 SERPL-SCNC: 22 MMOL/L (ref 21–32)
CREAT BLD-MCNC: 3.21 MG/DL
DEPRECATED RDW RBC AUTO: 41.2 FL (ref 35.1–46.3)
EGFRCR SERPLBLD CKD-EPI 2021: 22 ML/MIN/1.73M2 (ref 60–?)
EOSINOPHIL # BLD AUTO: 0.17 X10(3) UL (ref 0–0.7)
EOSINOPHIL NFR BLD AUTO: 2.2 %
ERYTHROCYTE [DISTWIDTH] IN BLOOD BY AUTOMATED COUNT: 13.9 % (ref 11–15)
GLUCOSE BLD-MCNC: 110 MG/DL (ref 70–99)
GLUCOSE BLDC GLUCOMTR-MCNC: 110 MG/DL (ref 70–99)
GLUCOSE BLDC GLUCOMTR-MCNC: 118 MG/DL (ref 70–99)
HCT VFR BLD AUTO: 37.9 %
HGB BLD-MCNC: 12.1 G/DL
IMM GRANULOCYTES # BLD AUTO: 0.02 X10(3) UL (ref 0–1)
IMM GRANULOCYTES NFR BLD: 0.3 %
LYMPHOCYTES # BLD AUTO: 2.61 X10(3) UL (ref 1–4)
LYMPHOCYTES NFR BLD AUTO: 34.5 %
MAGNESIUM SERPL-MCNC: 1.9 MG/DL (ref 1.6–2.6)
MAX DIASTOLIC BP: 89 MMHG
MAX HEART RATE: 68 BPM
MAX PREDICTED HEART RATE: 165 BPM
MAX SYSTOLIC BP: 166 MMHG
MAX WORK LOAD: 10
MCH RBC QN AUTO: 26.4 PG (ref 26–34)
MCHC RBC AUTO-ENTMCNC: 31.9 G/DL (ref 31–37)
MCV RBC AUTO: 82.8 FL
MONOCYTES # BLD AUTO: 0.91 X10(3) UL (ref 0.1–1)
MONOCYTES NFR BLD AUTO: 12 %
NEUTROPHILS # BLD AUTO: 3.82 X10 (3) UL (ref 1.5–7.7)
NEUTROPHILS # BLD AUTO: 3.82 X10(3) UL (ref 1.5–7.7)
NEUTROPHILS NFR BLD AUTO: 50.5 %
OSMOLALITY SERPL CALC.SUM OF ELEC: 300 MOSM/KG (ref 275–295)
PHOSPHATE SERPL-MCNC: 4.9 MG/DL (ref 2.4–5.1)
PLATELET # BLD AUTO: 265 10(3)UL (ref 150–450)
POTASSIUM SERPL-SCNC: 4 MMOL/L (ref 3.5–5.1)
RBC # BLD AUTO: 4.58 X10(6)UL
SODIUM SERPL-SCNC: 141 MMOL/L (ref 136–145)
WBC # BLD AUTO: 7.6 X10(3) UL (ref 4–11)

## 2024-07-25 PROCEDURE — 78452 HT MUSCLE IMAGE SPECT MULT: CPT | Performed by: INTERNAL MEDICINE

## 2024-07-25 PROCEDURE — 99239 HOSP IP/OBS DSCHRG MGMT >30: CPT | Performed by: HOSPITALIST

## 2024-07-25 PROCEDURE — 99232 SBSQ HOSP IP/OBS MODERATE 35: CPT | Performed by: INTERNAL MEDICINE

## 2024-07-25 PROCEDURE — 93017 CV STRESS TEST TRACING ONLY: CPT | Performed by: INTERNAL MEDICINE

## 2024-07-25 RX ORDER — AMLODIPINE BESYLATE 10 MG/1
10 TABLET ORAL DAILY
Qty: 30 TABLET | Refills: 0 | Status: SHIPPED | OUTPATIENT
Start: 2024-07-26 | End: 2024-07-25

## 2024-07-25 RX ORDER — AMLODIPINE BESYLATE 10 MG/1
10 TABLET ORAL DAILY
Qty: 30 TABLET | Refills: 0 | Status: SHIPPED | OUTPATIENT
Start: 2024-07-26

## 2024-07-25 RX ORDER — INSULIN DEGLUDEC 100 U/ML
15 INJECTION, SOLUTION SUBCUTANEOUS NIGHTLY
Qty: 5 EACH | Refills: 3 | Status: SHIPPED | OUTPATIENT
Start: 2024-07-25 | End: 2024-07-25

## 2024-07-25 RX ORDER — BLOOD-GLUCOSE METER
EACH MISCELLANEOUS
Qty: 1 KIT | Refills: 0 | Status: SHIPPED | OUTPATIENT
Start: 2024-07-25

## 2024-07-25 RX ORDER — HUMAN INSULIN 100 [IU]/ML
INJECTION, SUSPENSION SUBCUTANEOUS
Qty: 5 EACH | Refills: 0 | Status: SHIPPED | OUTPATIENT
Start: 2024-07-25

## 2024-07-25 RX ORDER — BLOOD SUGAR DIAGNOSTIC
STRIP MISCELLANEOUS
Qty: 100 STRIP | Refills: 6 | Status: SHIPPED | OUTPATIENT
Start: 2024-07-25 | End: 2024-07-25

## 2024-07-25 RX ORDER — LANCETS 33 GAUGE
EACH MISCELLANEOUS
Qty: 100 EACH | Refills: 6 | Status: SHIPPED | OUTPATIENT
Start: 2024-07-25 | End: 2024-07-25

## 2024-07-25 RX ORDER — AMLODIPINE BESYLATE 10 MG/1
10 TABLET ORAL DAILY
Status: DISCONTINUED | OUTPATIENT
Start: 2024-07-26 | End: 2024-07-25

## 2024-07-25 RX ORDER — CARVEDILOL 12.5 MG/1
12.5 TABLET ORAL 2 TIMES DAILY WITH MEALS
Qty: 60 TABLET | Refills: 0 | Status: SHIPPED | OUTPATIENT
Start: 2024-07-25

## 2024-07-25 RX ORDER — CARVEDILOL 12.5 MG/1
12.5 TABLET ORAL 2 TIMES DAILY WITH MEALS
Status: DISCONTINUED | OUTPATIENT
Start: 2024-07-25 | End: 2024-07-25

## 2024-07-25 RX ORDER — BLOOD SUGAR DIAGNOSTIC
STRIP MISCELLANEOUS
Qty: 100 STRIP | Refills: 6 | Status: SHIPPED | OUTPATIENT
Start: 2024-07-25

## 2024-07-25 RX ORDER — INSULIN ASPART 100 [IU]/ML
INJECTION, SOLUTION INTRAVENOUS; SUBCUTANEOUS
Qty: 5 EACH | Refills: 3 | Status: SHIPPED | OUTPATIENT
Start: 2024-07-25 | End: 2024-07-25

## 2024-07-25 RX ORDER — PEN NEEDLE, DIABETIC 32GX 5/32"
NEEDLE, DISPOSABLE MISCELLANEOUS
Qty: 100 EACH | Refills: 6 | Status: SHIPPED | OUTPATIENT
Start: 2024-07-25

## 2024-07-25 RX ORDER — CARVEDILOL 12.5 MG/1
12.5 TABLET ORAL 2 TIMES DAILY WITH MEALS
Qty: 60 TABLET | Refills: 0 | Status: SHIPPED | OUTPATIENT
Start: 2024-07-25 | End: 2024-07-25

## 2024-07-25 RX ORDER — LANCETS 33 GAUGE
EACH MISCELLANEOUS
Qty: 100 EACH | Refills: 6 | Status: SHIPPED | OUTPATIENT
Start: 2024-07-25

## 2024-07-25 RX ORDER — BLOOD-GLUCOSE METER
EACH MISCELLANEOUS
Qty: 1 KIT | Refills: 0 | Status: SHIPPED | OUTPATIENT
Start: 2024-07-25 | End: 2024-07-25

## 2024-07-25 RX ORDER — REGADENOSON 0.08 MG/ML
INJECTION, SOLUTION INTRAVENOUS
Status: COMPLETED
Start: 2024-07-25 | End: 2024-07-25

## 2024-07-25 RX ORDER — PEN NEEDLE, DIABETIC 32GX 5/32"
NEEDLE, DISPOSABLE MISCELLANEOUS
Qty: 100 EACH | Refills: 6 | Status: SHIPPED | OUTPATIENT
Start: 2024-07-25 | End: 2024-07-25
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
## 2024-07-25 NOTE — PLAN OF CARE
Heparin drip continues. US kidney/bladder- pending result. Stress test today.    Problem: Patient Centered Care  Goal: Patient preferences are identified and integrated in the patient's plan of care  Description: Interventions:  - What would you like us to know as we care for you? Im from home   - Provide timely, complete, and accurate information to patient/family  - Incorporate patient and family knowledge, values, beliefs, and cultural backgrounds into the planning and delivery of care  - Encourage patient/family to participate in care and decision-making at the level they choose  - Honor patient and family perspectives and choices  Outcome: Progressing     Problem: CARDIOVASCULAR - ADULT  Goal: Maintains optimal cardiac output and hemodynamic stability  Description: INTERVENTIONS:  - Monitor vital signs, rhythm, and trends  - Monitor for bleeding, hypotension and signs of decreased cardiac output  - Evaluate effectiveness of vasoactive medications to optimize hemodynamic stability  - Monitor arterial and/or venous puncture sites for bleeding and/or hematoma  - Assess quality of pulses, skin color and temperature  - Assess for signs of decreased coronary artery perfusion - ex. Angina  - Evaluate fluid balance, assess for edema, trend weights  Outcome: Progressing  Goal: Absence of cardiac arrhythmias or at baseline  Description: INTERVENTIONS:  - Continuous cardiac monitoring, monitor vital signs, obtain 12 lead EKG if indicated  - Evaluate effectiveness of antiarrhythmic and heart rate control medications as ordered  - Initiate emergency measures for life threatening arrhythmias  - Monitor electrolytes and administer replacement therapy as ordered  Outcome: Progressing     Problem: GASTROINTESTINAL - ADULT  Goal: Maintains or returns to baseline bowel function  Description: INTERVENTIONS:  - Assess bowel function  - Maintain adequate hydration with IV or PO as ordered and tolerated  - Evaluate effectiveness of  GI medications  - Encourage mobilization and activity  - Obtain nutritional consult as needed  - Establish a toileting routine/schedule  - Consider collaborating with pharmacy to review patient's medication profile  Outcome: Progressing     Problem: METABOLIC/FLUID AND ELECTROLYTES - ADULT  Goal: Glucose maintained within prescribed range  Description: INTERVENTIONS:  - Monitor Blood Glucose as ordered  - Assess for signs and symptoms of hyperglycemia and hypoglycemia  - Administer ordered medications to maintain glucose within target range  - Assess barriers to adequate nutritional intake and initiate nutrition consult as needed  - Instruct patient on self management of diabetes  Outcome: Progressing  Goal: Electrolytes maintained within normal limits  Description: INTERVENTIONS:  - Monitor labs and rhythm and assess patient for signs and symptoms of electrolyte imbalances  - Administer electrolyte replacement as ordered  - Monitor response to electrolyte replacements, including rhythm and repeat lab results as appropriate  - Fluid restriction as ordered  - Instruct patient on fluid and nutrition restrictions as appropriate  Outcome: Progressing     Problem: HEMATOLOGIC - ADULT  Goal: Maintains hematologic stability  Description: INTERVENTIONS  - Assess for signs and symptoms of bleeding or hemorrhage  - Monitor labs and vital signs for trends  - Administer supportive blood products/factors, fluids and medications as ordered and appropriate  - Administer supportive blood products/factors as ordered and appropriate  Outcome: Progressing  Goal: Free from bleeding injury  Description: (Example usage: patient with low platelets)  INTERVENTIONS:  - Avoid intramuscular injections, enemas and rectal medication administration  - Ensure safe mobilization of patient  - Hold pressure on venipuncture sites to achieve adequate hemostasis  - Assess for signs and symptoms of internal bleeding  - Monitor lab trends  - Patient is  to report abnormal signs of bleeding to staff  - Avoid use of toothpicks and dental floss  - Use electric shaver for shaving  - Use soft bristle tooth brush  - Limit straining and forceful nose blowing  Outcome: Progressing     Problem: SAFETY ADULT - FALL  Goal: Free from fall injury  Description: INTERVENTIONS:  - Assess pt frequently for physical needs  - Identify cognitive and physical deficits and behaviors that affect risk of falls.  - [place] fall precautions as indicated by assessment.  - Educate pt/family on patient safety including physical limitations  - Instruct pt to call for assistance with activity based on assessment  - Modify environment to reduce risk of injury  - Provide assistive devices as appropriate  - Consider OT/PT consult to assist with strengthening/mobility  - Encourage toileting schedule  Outcome: Progressing     Problem: DISCHARGE PLANNING  Goal: Discharge to home or other facility with appropriate resources  Description: INTERVENTIONS:  - Identify barriers to discharge w/pt and caregiver  - Include patient/family/discharge partner in discharge planning  - Arrange for needed discharge resources and transportation as appropriate  - Identify discharge learning needs (meds, wound care, etc)  - Arrange for interpreters to assist at discharge as needed  - Consider post-discharge preferences of patient/family/discharge partner  - Complete POLST form as appropriate  - Assess patient's ability to be responsible for managing their own health  - Refer to Case Management Department for coordinating discharge planning if the patient needs post-hospital services based on physician/LIP order or complex needs related to functional status, cognitive ability or social support system  Outcome: Progressing

## 2024-07-25 NOTE — PLAN OF CARE
Pt A/Ox4. RA. No complaints of CP. Stress test in am performed and normal. Hep gtt dc'd per pcp. No DOAC on dc per pcp. Teach back education with insulin pen. Diabetes education provided. Nephro and cards cleared. DC order in place. No insurance-  provided resources. Hard scripts provided. AVS printed and education provided by dc RN. Family to transport home. Call light within reach and safety precautions in place.     Problem: Patient Centered Care  Goal: Patient preferences are identified and integrated in the patient's plan of care  Description: Interventions:  - What would you like us to know as we care for you? Im from home   - Provide timely, complete, and accurate information to patient/family  - Incorporate patient and family knowledge, values, beliefs, and cultural backgrounds into the planning and delivery of care  - Encourage patient/family to participate in care and decision-making at the level they choose  - Honor patient and family perspectives and choices  Outcome: Adequate for Discharge     Problem: Patient/Family Goals  Goal: Patient/Family Long Term Goal  Description: Patient's Long Term Goal: To go home    Interventions:  -Cardiology, nephrology, and endocrinology consults  - Further testing  - Follow providers recommendations  - See additional Care Plan goals for specific interventions  Outcome: Adequate for Discharge  Goal: Patient/Family Short Term Goal  Description: Patient's Short Term Goal: Keep my heart in regular rhythm    Interventions:   - Cardiology consult  - Further testing  - Follow provider recommendations  - See additional Care Plan goals for specific interventions  Outcome: Adequate for Discharge     Problem: CARDIOVASCULAR - ADULT  Goal: Maintains optimal cardiac output and hemodynamic stability  Description: INTERVENTIONS:  - Monitor vital signs, rhythm, and trends  - Monitor for bleeding, hypotension and signs of decreased cardiac output  - Evaluate effectiveness of  vasoactive medications to optimize hemodynamic stability  - Monitor arterial and/or venous puncture sites for bleeding and/or hematoma  - Assess quality of pulses, skin color and temperature  - Assess for signs of decreased coronary artery perfusion - ex. Angina  - Evaluate fluid balance, assess for edema, trend weights  Outcome: Adequate for Discharge  Goal: Absence of cardiac arrhythmias or at baseline  Description: INTERVENTIONS:  - Continuous cardiac monitoring, monitor vital signs, obtain 12 lead EKG if indicated  - Evaluate effectiveness of antiarrhythmic and heart rate control medications as ordered  - Initiate emergency measures for life threatening arrhythmias  - Monitor electrolytes and administer replacement therapy as ordered  Outcome: Adequate for Discharge     Problem: GASTROINTESTINAL - ADULT  Goal: Maintains or returns to baseline bowel function  Description: INTERVENTIONS:  - Assess bowel function  - Maintain adequate hydration with IV or PO as ordered and tolerated  - Evaluate effectiveness of GI medications  - Encourage mobilization and activity  - Obtain nutritional consult as needed  - Establish a toileting routine/schedule  - Consider collaborating with pharmacy to review patient's medication profile  Outcome: Adequate for Discharge     Problem: METABOLIC/FLUID AND ELECTROLYTES - ADULT  Goal: Glucose maintained within prescribed range  Description: INTERVENTIONS:  - Monitor Blood Glucose as ordered  - Assess for signs and symptoms of hyperglycemia and hypoglycemia  - Administer ordered medications to maintain glucose within target range  - Assess barriers to adequate nutritional intake and initiate nutrition consult as needed  - Instruct patient on self management of diabetes  Outcome: Adequate for Discharge  Goal: Electrolytes maintained within normal limits  Description: INTERVENTIONS:  - Monitor labs and rhythm and assess patient for signs and symptoms of electrolyte imbalances  - Administer  electrolyte replacement as ordered  - Monitor response to electrolyte replacements, including rhythm and repeat lab results as appropriate  - Fluid restriction as ordered  - Instruct patient on fluid and nutrition restrictions as appropriate  Outcome: Adequate for Discharge     Problem: HEMATOLOGIC - ADULT  Goal: Maintains hematologic stability  Description: INTERVENTIONS  - Assess for signs and symptoms of bleeding or hemorrhage  - Monitor labs and vital signs for trends  - Administer supportive blood products/factors, fluids and medications as ordered and appropriate  - Administer supportive blood products/factors as ordered and appropriate  Outcome: Adequate for Discharge  Goal: Free from bleeding injury  Description: (Example usage: patient with low platelets)  INTERVENTIONS:  - Avoid intramuscular injections, enemas and rectal medication administration  - Ensure safe mobilization of patient  - Hold pressure on venipuncture sites to achieve adequate hemostasis  - Assess for signs and symptoms of internal bleeding  - Monitor lab trends  - Patient is to report abnormal signs of bleeding to staff  - Avoid use of toothpicks and dental floss  - Use electric shaver for shaving  - Use soft bristle tooth brush  - Limit straining and forceful nose blowing  Outcome: Adequate for Discharge     Problem: SAFETY ADULT - FALL  Goal: Free from fall injury  Description: INTERVENTIONS:  - Assess pt frequently for physical needs  - Identify cognitive and physical deficits and behaviors that affect risk of falls.  - [place] fall precautions as indicated by assessment.  - Educate pt/family on patient safety including physical limitations  - Instruct pt to call for assistance with activity based on assessment  - Modify environment to reduce risk of injury  - Provide assistive devices as appropriate  - Consider OT/PT consult to assist with strengthening/mobility  - Encourage toileting schedule  Outcome: Adequate for Discharge      Problem: DISCHARGE PLANNING  Goal: Discharge to home or other facility with appropriate resources  Description: INTERVENTIONS:  - Identify barriers to discharge w/pt and caregiver  - Include patient/family/discharge partner in discharge planning  - Arrange for needed discharge resources and transportation as appropriate  - Identify discharge learning needs (meds, wound care, etc)  - Arrange for interpreters to assist at discharge as needed  - Consider post-discharge preferences of patient/family/discharge partner  - Complete POLST form as appropriate  - Assess patient's ability to be responsible for managing their own health  - Refer to Case Management Department for coordinating discharge planning if the patient needs post-hospital services based on physician/LIP order or complex needs related to functional status, cognitive ability or social support system  Outcome: Adequate for Discharge

## 2024-07-25 NOTE — DIABETES ED
[facility]    Diabetes Education  Note    Sjdesire Simmons Sr. Patient Status:  Inpatient   3/11/1970 MRN [id number]  Location [facility] 3W/SW Attending John Fish MD  Hosp Day # 3 PCP No primary care provider on file.      Labs:    HgbA1C (%)   Date Value   2024 9.8 (H)         Reason for Visit:Elevated A1c value.  Met with patient and family in room to discuss diabetes management at home.  He indicates he is familiar with use of glucose meter however has not monitored his blood sugar in \"a long time\".  Educated patient on blood glucose targets and reinforced the benefits of testing blood sugar as directed. Due to insurance status, patient gifted glucose meter.   We discussed his current A1c value and he was familiar with goal of <7%.  He indicates he has administered insulin previously using vial/syringe technique however states the MD \"stopped prescribing the medication.\".  He does not have any insulin medication at home.  He confirmed he is currently without insurance.  Instructed on OTC lower cost option for diabetes supplies and insulin to be purchased at [facility]. He would prefer utilizing the insulin pen for mixed insulins at home. Instructed on use of insulin pen technique with return demonstration.  He is unfamiliar with meal planning for persons with Diabetes. 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  Instructed on basic meal planning guidelines and handout provided and reviewed with him for Balanced plate method of meal planning.  Encouraged him to avoid \"chips\" for snacks and consumes small amount of protein or non starchy vegetables.  He verbalized understanding.  He is unfamiliar with signs, symptoms, prevention and treatment of low blood sugar.  Instructed on actions of mixed insulin and the importance of healthy meal planning for hypoglycemia prevention.     He would benefit from prescriptions to be  purchased at [employer] for Relion glucose meter, Relion 70/30 mixed insulin pens, pen needles.    Education Provided:  Benefits of testing BG as directed - record results and report to MD  How to inject using insulin pen and removing 2 caps from home insulin pen needle  Hypoglycemia symptoms/treatment/prevention  Actions of mixed insulins  Basic Diet Guidelines  Importance of good BG control to prevent short and long term complications  Importance of close follow up with PCP and medical team    Patient verbalized understanding and was receptive to information provided.      Recommendations:  Patient to self-administer all insulin doses with RN supervision   Establish care with outpatient medical team.  Administer insulin and monitor blood sugar as directed.          Shaista Montoya RN  Diabetes Educator  7/25/2024  8:52 AM
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
## 2024-07-25 NOTE — DISCHARGE INSTRUCTIONS
If INSULIN PEN is ordered at discharge, remember to remove TWO caps from the needle before injecting.     Diabetes:  Your A1C level is greater than 8%.  It is recommended that you attend an outpatient diabetes education program.  Please discuss with your Primary Care Provider at your next visit to obtain a referral.        Follow-up with PCP in 1 to 2 weeks  Follow-up with a nephrologist as instructed  Follow-up with cardiology you can follow-up with Dr. Pizarro or as referred by [facility]  Monitor blood sugars twice daily take results to PCP we are starting you on an insulin mix pen which is to be used twice daily

## 2024-07-25 NOTE — PROGRESS NOTES
[facility]  Cardiology Progress Note    Sj Simmons Sr. Patient Status:  Inpatient    3/11/1970 MRN [id number]   Location [facility] 3W/SW Attending John Fish MD   Hosp Day # 3 PCP No primary care provider on file.     Subjective     No cardiac complaints today.    Objective:   Patient Vitals for the past 24 hrs:   BP Temp Temp src Pulse Resp SpO2 Weight   24 1208 160/88 -- -- 62 -- -- --   24 0808 (!) 178/86 98.8 °F (37.1 °C) Oral 61 16 95 % --   24 0700 (!) 166/95 -- -- 61 -- -- --   24 0602 (!) 170/98 98.9 °F (37.2 °C) Oral 60 15 94 % --   24 0500 -- -- -- -- -- -- 266 lb 12.8 oz (121 kg)   24 0331 -- -- -- 62 -- -- --   24 2253 143/76 -- -- 65 14 96 % --   24 2133 (!) 176/88 98.6 °F (37 °C) Oral 62 15 96 % --   24 1910 154/84 -- -- 67 -- -- --   24 1907 -- -- -- 68 -- -- --   24 1738 (!) 174/96 -- -- 63 18 97 % --   24 1515 (!) 168/90 98 °F (36.7 °C) Oral 60 18 96 % --       Intake/Output:   Last 3 shifts:   Intake/Output                   24 07 - 24 0659 24 07 - 24 0659 24 07 - 24 0659       Intake    P.O.  480  680  358    P.O. 480 680 358    I.V.  672.3  401.9  --    Volume (mL) Heparin 672.3 401.9 --    Total Intake 1152.3 1081.9 358       Output    Urine  600  500  --    Urine 600 500 --    Urine Occurrence 2 x -- --    Total Output 600 500 --       Net I/O     552.3 581.9 358             Scheduled Meds:    insulin aspart  2 Units Subcutaneous Once    amLODIPine  5 mg Oral Daily    pantoprazole  40 mg Oral QAM AC    insulin degludec  15 Units Subcutaneous Daily    metoprolol succinate  50 mg Oral 2x Daily(Beta Blocker)    insulin aspart  1-7 Units Subcutaneous TID CC    insulin aspart  5 Units Subcutaneous TID CC     Continuous Infusions:    continuous dose heparin 1,600 Units/hr (24 2200)       Results:     Lab Results   Component Value Date     WBC 7.6 2024    HGB 12.1 (L) 2024    HCT 37.9 (L) 2024    .0 2024    CREATSERUM 3.21 (H) 2024    BUN 34 (H) 2024     2024    K 4.0 2024     2024    CO2 22.0 2024     (H) 2024    CA 8.5 (L) 2024    ALB 3.3 2024    ALKPHO 108 2024    ALKPHO 110 2024    BILT 0.5 2024    BILT 0.4 2024    TP 6.7 2024    TP 6.5 2024    AST 33 2024    AST 32 2024    ALT 35 2024    ALT 39 2024    PTT 71.1 (H) 2024    TSH 4.503 2024    MG 1.9 2024    PHOS 4.9 2024     (H) 2024       Recent Labs   Lab 24  0434 24  0609 24  0632   * 111* 110*   BUN 36* 37* 34*   CREATSERUM 3.66* 3.51* 3.21*   CA 8.7 8.4* 8.5*    140 141   K 4.4 4.2 4.0    111 111   CO2 24.0 20.0* 22.0     Recent Labs   Lab 24  0434 24  0609 24  0632   RBC 4.28* 4.41 4.58   HGB 11.3* 11.8* 12.1*   HCT 35.2* 36.2* 37.9*   MCV 82.2 82.1 82.8   MCH 26.4 26.8 26.4   MCHC 32.1 32.6 31.9   RDW 14.0 13.7 13.9   NEPRELIM 5.08 5.15 3.82   WBC 9.0 8.4 7.6   .0 225.0 265.0       Recent Labs   Lab 24  0653   BNPML 664*       Recent Labs   Lab 24  2212 24  0654 24  0434   CK 1,405* 1,122* 862*       CARD NUC STRESS TEST LEXISCAN (CPT 01903/24346/)    Result Date: 2024  CONCLUSION:  1. Normal myocardial perfusion study. 2. Indeterminate ECG response due to baseline ST-T wave abnormality. 3. Calculated LVEF of 47%     Dictated by (CST): Ross Hanley MD on 2024 at 11:56 AM     Finalized by (CST): Ross Hanley MD on 2024 at 11:57 AM             CARD ECHO 2D DOPPLER (CPT=93306)    Result Date: 2024  Transthoracic Echocardiogram Name:Sj Simmons Date: 2024 :  1970 Ht:  (74in)  BP: 149 / 86 MRN:  [id number]    Age:  54years    Wt:  (266lb) HR: 72bpm Loc:  [facility]        Gndr: M          BSA: 2.45m^2 Sonographer: Casey Ordering:    Shirin Zaragoza Consulting:  Lionel Vance ---------------------------------------------------------------------------- History/Indications:   Chest pain.  Dyspnea. Elevated troponin. ---------------------------------------------------------------------------- Procedure information:  A transthoracic complete 2D study was performed. Additional evaluation included M-mode, complete spectral Doppler, and color Doppler.  Patient status:  Inpatient.  Location:  Bedside.    This was a routine study. Transthoracic echocardiography for diagnosis and ventricular function evaluation. Image quality was adequate. ECG rhythm:   Normal sinus ---------------------------------------------------------------------------- Conclusions: 1. Left ventricle: The cavity size was normal. Wall thickness was mildly    increased. Systolic function was normal. The estimated ejection fraction    was 55-60%, by visual assessment. Doppler parameters are consistent with    abnormal left ventricular relaxation - grade 1 diastolic dysfunction. 2. Left atrium: The atrium was increased in size. The left atrial volume was    mildly increased. 3. Mitral valve: There was mild regurgitation. 4. Pulmonary arteries: Systolic pressure was within the normal range,    estimated to be 23mm Hg. Impressions:  No previous study was available for comparison. * ---------------------------------------------------------------------------- * Findings: Left ventricle:  The cavity size was normal. Wall thickness was mildly increased. Systolic function was normal. The estimated ejection fraction was 55-60%, by visual assessment. Doppler parameters are consistent with abnormal left ventricular relaxation - grade 1 diastolic dysfunction. Left atrium:  The atrium was increased in size. The left atrial volume was mildly increased. Right ventricle:  The cavity size was normal. Systolic function was normal. Systolic  pressure was within the normal range. Right atrium:  The atrium was normal in size. Mitral valve:  The valve was structurally normal. The leaflets were normal thickness. Leaflet separation was normal.  Doppler:  Transvalvular velocity was within the normal range. There was no evidence for stenosis. There was mild regurgitation. Aortic valve:  The valve was structurally normal. The valve was trileaflet. The leaflets were normal thickness. Cusp separation was normal.  Doppler: Transvalvular velocity was within the normal range. There was no evidence for stenosis. There was no significant regurgitation. Tricuspid valve:  The valve is structurally normal. Leaflet separation was normal.  Doppler:  Transvalvular velocity was within the normal range. There was no evidence for stenosis. There was trivial regurgitation. Pulmonic valve:   The valve is structurally normal. Cusp separation was normal.  Doppler:  Transvalvular velocity was within the normal range. There was no evidence for stenosis. There was no significant regurgitation. Pericardium:   There was no pericardial effusion. Aorta: Aortic root: The aortic root was normal. Ascending aorta: The ascending aorta was normal. Pulmonary arteries: Systolic pressure was within the normal range, estimated to be 23mm Hg. Systemic veins:  Central venous respirophasic diameter changes are blunted (< 50%). Inferior vena cava: The IVC was dilated. ---------------------------------------------------------------------------- Measurements  Left ventricle                    Value        Ref  IVS thickness, ED, PLAX       (H) 1.2   cm     0.6 -                                                 1.0  LV ID, ED, PLAX                   4.7   cm     4.2 -                                                 5.8  LV ID, ES, PLAX                   3.2   cm     2.5 -                                                 4.0  LV PW thickness, ED, PLAX     (H) 1.2   cm     0.6 -                                                  1.0  IVS/LV PW ratio, ED, PLAX         1.00         --------  LV PW/LV ID ratio, ED, PLAX       0.26         --------  LV ejection fraction              60    %      52 - 72  Stroke volume/bsa, 2D             26    ml/m^2 --------  LV e', lateral                (L) 6.6   cm/sec >=10.0  LV E/e', lateral              (H) 17           <=13  LV e', medial                 (L) 6.6   cm/sec >=7.0  LV E/e', medial                   17           --------  LV e', average                    6.6   cm/sec --------  LV E/e', average              (H) 17           <=14  LVOT                              Value        Ref  LVOT ID                           2.1   cm     --------  LVOT peak velocity, S             1.03  m/sec  --------  LVOT VTI, S                       18.9  cm     --------  LVOT peak gradient, S             4     mm Hg  --------  LVOT mean gradient, S             2     mm Hg  --------  Stroke volume (SV), LVOT DP       65    ml     --------  Stroke index (SV/bsa), LVOT       27    ml/m^2 --------  DP  Aortic root                       Value        Ref  Aortic root ID                    3.6   cm     3.0 -                                                 4.5  Ascending aorta                   Value        Ref  Ascending aorta ID                3.1   cm     2.2 -                                                 3.8  Left atrium                       Value        Ref  LA ID, A-P, ES                    4.0   cm     3.0 -                                                 4.0  LA volume, S                  (H) 75    ml     18 - 58  LA volume/bsa, S                  31    ml/m^2 16 - 34  LA volume, ES, 1-p A4C        (H) 68    ml     18 - 58  LA volume, ES, 1-p A2C        (H) 78    ml     18 - 58  LA volume, ES, A/L                83    ml     --------  LA volume/bsa, ES, A/L            34    ml/m^2 16 - 34  LA/aortic root ratio              1.11         --------  Mitral valve                      Value         Ref  Mitral E-wave peak velocity       1.11  m/sec  --------  Mitral A-wave peak velocity       0.35  m/sec  --------  Mitral deceleration time          173   ms     --------  Mitral peak gradient, D           5     mm Hg  --------  Mitral E/A ratio, peak            3.2          --------  Pulmonary artery                  Value        Ref  PA pressure, S, DP                23    mm Hg  --------  Tricuspid valve                   Value        Ref  Tricuspid regurg peak             1.77  m/sec  <=2.8  velocity  Tricuspid peak RV-RA gradient     13    mm Hg  --------  Systemic veins                    Value        Ref  Estimated CVP                     10    mm Hg  --------  Inferior vena cava                Value        Ref  ID                            (H) 2.2   cm     <=2.1  Right ventricle                   Value        Ref  TAPSE, 2D                         2.28  cm     >=1.70  TAPSE, MM                         2.28  cm     >=1.70  RV pressure, S, DP                23    mm Hg  --------  RV s', lateral                    14.4  cm/sec >=9.5 Legend: (L)  and  (H)  rodrigo values outside specified reference range. ---------------------------------------------------------------------------- Prepared and electronically signed by Carlos Pizarro 07/22/2024 14:51     Exam:     Physical Exam:  General: Alert and oriented x 3. No apparent distress.   HEENT: Normocephalic, anicteric sclera, neck supple, no thyromegaly or adenopathy.  Neck: No JVD, carotids 2+, no bruits.  Cardiac: Regular rate and rhythm. S1, S2 normal. No murmur, pericardial rub, S3, or extra cardiac sounds.  Lungs: Clear without wheezes, rales, rhonchi or dullness.  Normal excursions and effort.  Abdomen: Soft, non-tender. No organosplenomegally, mass or rebound, BS-present.  Extremities: Without clubbing or cyanosis. No edema.    Neurologic: Alert and oriented, normal affect. No focal defects  Skin: Warm and dry.     Assessment and Plan:    Assessment:  NSTEMI  Trop 1425 >1122 >862  Echo with LVEF 55-60%, mild MR  Normal nuclear stress test  Secondary to cardiac contusion  Paroxysmal atrial fibrillation   Currently in SR  On BB  Chadsvasc3 on IV heparin gtt  BRUNA   Creatinine 3.51 today  US kidney pending  Per nephrology  HTN - 167/90  On BB and amlodipine  Lisinopril on hold  Type II DM - HgbA1c 9.8     Plan:  Nuclear stress test normal, elevate troponin secondary to cardiac contusion  Continue IV heparin, transition to DOAC per primary service  Stop metoprolol, start carvedilol 12.5 mg twice daily  Increase amlodipine to 10 mg daily    Ross Hanley MD  [facility] [place] [facility]  7/25/2024

## 2024-07-25 NOTE — DISCHARGE PLANNING
Patient was provided with discharge instructions, education, and follow up information. Patient's daughter present for discharge instructions with patient's consent. Printed prescriptions were given to patient.  Patient verbalizes understanding of follow up information, specifically following up with PCP, cardiology, nephrology and endocrinology outpatient, medications prescribed, dose, timing, purpose and side effects, checking blood sugar at home, insulin, signs of high and low blood sugar, afib discharge instructions and stroke prevention. Patient has no questions after reviewing all instructions and will be going home with his family after he is seen by Dr. Vance.     Verito ARRINGTON, Discharge Leader [id number]

## 2024-07-26 ENCOUNTER — PATIENT OUTREACH (OUTPATIENT)
Dept: CASE MANAGEMENT | Age: 54
End: 2024-07-26

## 2024-07-29 NOTE — PROGRESS NOTES
BECK s/w pt who stated that he was doing fine. BECK did discuss TCC as patient stated that he did not have a PCP. He did state that he was interested and then told BECK to call his daughter Becky. BECK called Becky and LMTCB.

## 2024-07-30 ENCOUNTER — OFFICE VISIT (OUTPATIENT)
Dept: ENDOCRINOLOGY CLINIC | Facility: CLINIC | Age: 54
End: 2024-07-30

## 2024-07-30 VITALS — HEART RATE: 61 BPM | DIASTOLIC BLOOD PRESSURE: 71 MMHG | WEIGHT: 275 LBS | SYSTOLIC BLOOD PRESSURE: 136 MMHG

## 2024-07-30 DIAGNOSIS — E11.65 UNCONTROLLED TYPE 2 DIABETES MELLITUS WITH HYPERGLYCEMIA (HCC): Primary | ICD-10-CM

## 2024-07-30 LAB
GLUCOSE BLOOD: 123
TEST STRIP LOT #: NORMAL NUMERIC

## 2024-07-30 PROCEDURE — 82947 ASSAY GLUCOSE BLOOD QUANT: CPT | Performed by: NURSE PRACTITIONER

## 2024-07-30 PROCEDURE — 99214 OFFICE O/P EST MOD 30 MIN: CPT | Performed by: NURSE PRACTITIONER

## 2024-07-30 NOTE — PATIENT INSTRUCTIONS
A1C: 9.8% on 7/22/2024  Blood glucose: 123 in clinic today    Medications:   - continue with Tresiba 12 units twice daily       -lets work on limiting carbohydrates to 45gm per meal/ max 135gm per day  -avoid eating snacks between meals  -avoid eating sweets or soda/fruit juices or Gatorade or energy drinks   -eat dinner at least 2 hours prior to going to bed at night  - increase (aerobic) exercise to at least 4-5 days per week for at least 30 mins each session    -avoid going more than 2 consecutive days of no exercise    - schedule apt with ophthalmology (eye doctor)   - schedule apt with podiatry   - schedule apt with diabetes learning center   - please give me an update on your blood glucose readings in 1-2 weeks     Weight:  Wt Readings from Last 6 Encounters:   07/30/24 275 lb (124.7 kg)   07/25/24 266 lb 12.8 oz (121 kg)     A1C goal:  <7.0% (preferably <6.5%)    Blood sugar testing:  Test your blood sugar 2 times daily   Recommended times to test: Before breakfast (fasting) and alternate with before lunch or before dinner     Blood sugar targets:  Before breakfast:  (preferably < 110)  Before meals: <150     Call for persistent blood sugars < 75 or > 200
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
## 2024-11-10 NOTE — ED QUICK NOTES
Orders for admission, patient is aware of plan and ready to go upstairs. Any questions, please call ED MURPHY Azul at extension [phone].     Patient Covid vaccination status: Unvaccinated     COVID Test Ordered in ED: None    COVID Suspicion at Admission: N/A    Running Infusions:    continuous dose heparin          Mental Status/LOC at time of transport: a/o x 4    Other pertinent information:   CIWA score: N/A   NIH score:  N/A

## 2024-11-10 NOTE — ED PROVIDER NOTES
Patient Seen in: [facility] Emergency Department      History     Chief Complaint   Patient presents with    Chest Pain Angina     Stated Complaint: SOB, CP    Subjective:   HPI      54-year-old male presents for evaluation for chest pain and shortness of breath for the past week.  Symptoms have been worsening.  Family reports he has been having orthopnea and has been sleeping upright.  He reports the chest pain to be a chest tightness worse with exertion.  Dyspnea is worse with exertion.  He is also noted lower extremity swelling.  He has had a nonproductive cough.  He denies any fevers, chills.    Objective:     Past Medical History:    Diabetes (HCC)    Essential hypertension              History reviewed. No pertinent surgical history.             Social History     Socioeconomic History    Marital status:    Tobacco Use    Smoking status: Never    Smokeless tobacco: Never   Substance and Sexual Activity    Alcohol use: Never    Drug use: Never     Social Drivers of Health     Food Insecurity: No Food Insecurity (7/22/2024)    Food Insecurity     Food Insecurity: Never true   Transportation Needs: No Transportation Needs (7/22/2024)    Transportation Needs     Lack of Transportation: No   Housing Stability: Low Risk  (7/22/2024)    Housing Stability     Housing Instability: No                  Physical Exam     ED Triage Vitals [11/10/24 1207]   BP (!) 173/115   Pulse 65   Resp 22   Temp 97.7 °F (36.5 °C)   Temp src Oral   SpO2 95 %   O2 Device None (Room air)       Current Vitals:   Vital Signs  BP: (!) 195/92  Pulse: 62  Resp: 21  Temp: 97.7 °F (36.5 °C)  Temp src: Oral  MAP (mmHg): (!) 122    Oxygen Therapy  SpO2: 98 %  O2 Device: None (Room air)        Physical Exam  Vitals and nursing note reviewed.   Constitutional:       Appearance: Normal appearance.   HENT:      Head: Normocephalic and atraumatic.   Cardiovascular:      Rate and Rhythm: Normal rate. Rhythm irregularly irregular.       Pulses: Normal pulses.           Dorsalis pedis pulses are 2+ on the right side and 2+ on the left side.        Posterior tibial pulses are 2+ on the right side and 2+ on the left side.      Heart sounds: Normal heart sounds.   Pulmonary:      Effort: Pulmonary effort is normal.      Breath sounds: Rales present.   Musculoskeletal:         General: Normal range of motion.      Cervical back: Normal range of motion.      Right lower le+ Pitting Edema present.      Left lower le+ Pitting Edema present.   Skin:     General: Skin is warm and dry.   Neurological:      General: No focal deficit present.      Mental Status: He is alert.             ED Course     Labs Reviewed   CBC WITH DIFFERENTIAL WITH PLATELET - Abnormal; Notable for the following components:       Result Value    HGB 12.5 (*)     All other components within normal limits   BASIC METABOLIC PANEL (8) - Abnormal; Notable for the following components:    Glucose 166 (*)     BUN 45 (*)     Creatinine 4.12 (*)     Calculated Osmolality 309 (*)     eGFR-Cr 16 (*)     All other components within normal limits   TROPONIN I HIGH SENSITIVITY - Abnormal; Notable for the following components:    Troponin I (High Sensitivity) 329 (*)     All other components within normal limits   BNP (B TYPE NATRIURETIC PEPTIDE) - Abnormal; Notable for the following components:    Beta Natriuretic Peptide 758 (*)     All other components within normal limits   MAGNESIUM - Normal   PTT, ACTIVATED - Normal   LIPID PANEL - Normal   RAINBOW DRAW BLUE     EKG    Rate, intervals and axes as noted on EKG Report.  Rate: 63  Rhythm: Atrial Fibrillation  Reading: no stemi, interpreted by myself.            ED Course as of 11/10/24 1410  ------------------------------------------------------------  Time: 11/10 1346  Value: XR CHEST AP PORTABLE  (CPT=71045)  Comment: Per my independent interpretation, patient's CXR demonstrates right sided pleural effusion.                MDM           Admission disposition: 11/10/2024  1:08 PM           Medical Decision Making  Differential diagnosis includes but is not limited to ACS, CHF, PNA, etc    CBC was unremarkable.  Patient's chemistry shows worsening CKD.  Patient's EKG shows recurrent atrial flutter/fibrillation.  Patient does appear to be volume overloaded.  He has started on IV Lasix.  BNP was elevated.  Patient's troponin was elevated, this is likely demand ischemia, ACS considered less likely.  He is started on a heparin drip.  Patient will be admitted with cardiology on consult.    Rhythm Strip: Rate 70 atrial fibrillation rhythm as interpreted by myself. The cardiac monitor was ordered secondary to the patient's arrhythmia, chest pain, and congestive heart failure.     Complicating factors: The patient  has a past medical history of Diabetes (HCC) and Essential hypertension. and  has no past surgical history on file. that contribute to the medical complexity of this ED evaluation.     Medical Record Review: I personally reviewed available prior medical records for any recent pertinent discharge summaries, testing, and procedures, and reviewed those reports.        Problems Addressed:  Acute congestive heart failure, unspecified heart failure type (HCC): acute illness or injury with systemic symptoms  Atrial fibrillation and flutter (HCC): acute illness or injury with systemic symptoms  NSTEMI (non-ST elevated myocardial infarction) (HCC): acute illness or injury with systemic symptoms    Amount and/or Complexity of Data Reviewed  Independent Historian:      Details: Family as per [facility]  External Data Reviewed: ECG and notes.     Details: EKG 7/22/24 reviewed, rate 79 with NSR.  Stress test reviewed on 7/25/24 and was without ischemic changes, EF 47%  Labs: ordered. Decision-making details documented in ED Course.  Radiology: ordered and independent interpretation performed. Decision-making details documented in ED Course.  ECG/medicine tests:  ordered and independent interpretation performed. Decision-making details documented in ED Course.  Discussion of management or test interpretation with external provider(s): Discussed with DYANA Desouza with [facility] who accepts consult.  Dr. Denny accepts admission.     Risk  Drug therapy requiring intensive monitoring for toxicity.  Decision regarding hospitalization.  Risk Details: IV heparin    Critical Care  Total time providing critical care: minutes (45 minutes including time spent examining and re-evaluating the patient, ordering and reviewing laboratory tests, documenting, reviewing previous records, obtaining information from the family, and speaking with consultants, admitting doctors, nurses and medics and excludes any time spent on procedures.  )        Disposition and Plan     Clinical Impression:  1. Acute congestive heart failure, unspecified heart failure type (HCC)    2. NSTEMI (non-ST elevated myocardial infarction) (HCC)    3. Atrial fibrillation and flutter (HCC)         Disposition:  Admit  11/10/2024  1:08 pm    Follow-up:  No follow-up provider specified.        Medications Prescribed:  Current Discharge Medication List              Supplementary Documentation:         Hospital Problems       Present on Admission  Date Reviewed: 7/30/2024            ICD-10-CM Noted POA    * (Principal) Acute congestive heart failure, unspecified heart failure type (HCC) I50.9 11/10/2024 Unknown
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
## 2024-11-10 NOTE — ED INITIAL ASSESSMENT (HPI)
Patient arrives with reports of SOB/CP x1 week. Reports 1 emesis occurrence today. Denies fevers. +cough. Breathing even and unlabored at this time. Speaking in full sentences.   Did not take BP meds today. C/o HA.

## 2024-11-10 NOTE — CONSULTS
Health system  Cardiology Consultation    Troy Gustafson Sr. Patient Status:  Inpatient    3/11/1970 MRN [id number]   Location [facility] 3W/SW Attending Felipa Denny MD   Hosp Day # 0 PCP None Pcp     Reason for Consultation:  Elevated troponin    History of Present Illness:  Troy Gustafson Sr. is a a(n) 54 year old diabetic male with hx of paroxysmal afib, CKD, HTN and HLP who presents with worsening JOHN and atypical CP when bending down.  Denies CP with walking.  Currently CP free.    He tells me he is not taking heart meds cause they make him feel poorly and he was not prescribed a cholesterol med.  In our office patient was prescribed rosuvastatin 10 mg nightly     History:  Past Medical History:    Diabetes (HCC)    Essential hypertension     History reviewed. No pertinent surgical history.  No family history on file.   reports that he has never smoked. He has never used smokeless tobacco. He reports that he does not drink alcohol and does not use drugs.    Allergies:  Allergies[1]    Medications:    Current Facility-Administered Medications:     heparin (Porcine) 75090 units/250mL infusion ED INITIAL DOSE, 1,000 Units/hr, Intravenous, Once    heparin (Porcine) 26712 units/250mL infusion ACS/AFIB CONTINUOUS, 200-3,000 Units/hr, Intravenous, Continuous    glucose (Dex4) 15 GM/59ML oral liquid 15 g, 15 g, Oral, Q15 Min PRN **OR** glucose (Glutose) 40% oral gel 15 g, 15 g, Oral, Q15 Min PRN **OR** glucose-vitamin C (Dex-4) chewable tab 4 tablet, 4 tablet, Oral, Q15 Min PRN **OR** dextrose 50% injection 50 mL, 50 mL, Intravenous, Q15 Min PRN **OR** glucose (Dex4) 15 GM/59ML oral liquid 30 g, 30 g, Oral, Q15 Min PRN **OR** glucose (Glutose) 40% oral gel 30 g, 30 g, Oral, Q15 Min PRN **OR** glucose-vitamin C (Dex-4) chewable tab 8 tablet, 8 tablet, Oral, Q15 Min PRN    insulin degludec (Tresiba) 100 units/mL flextouch 8 Units, 8 Units, Subcutaneous, Nightly    insulin aspart (NovoLOG) 100  Units/mL FlexPen 1-7 Units, 1-7 Units, Subcutaneous, TID CC    acetaminophen (Tylenol Extra Strength) tab 500 mg, 500 mg, Oral, Q4H PRN    melatonin tab 3 mg, 3 mg, Oral, Nightly PRN    ondansetron (Zofran) 4 MG/2ML injection 4 mg, 4 mg, Intravenous, Q6H PRN    prochlorperazine (Compazine) 10 MG/2ML injection 5 mg, 5 mg, Intravenous, Q8H PRN    polyethylene glycol (PEG 3350) (Miralax) 17 g oral packet 17 g, 17 g, Oral, Daily PRN    sennosides (Senokot) tab 17.2 mg, 17.2 mg, Oral, Nightly PRN    bisacodyl (Dulcolax) 10 MG rectal suppository 10 mg, 10 mg, Rectal, Daily PRN    amLODIPine (Norvasc) tab 5 mg, 5 mg, Oral, Daily    hydrALAZINE (Apresoline) tab 25 mg, 25 mg, Oral, Q6H PRN    Review of Systems:  A comprehensive review of systems was negative if not otherwise mention in above HPI.    BP (!) 208/106   Pulse 82   Temp 98 °F (36.7 °C) (Oral)   Resp 21   Ht 75\"   Wt (!) 304 lb 7.3 oz   SpO2 95%   BMI 38.05 kg/m²   Temp (24hrs), Av.9 °F (36.6 °C), Min:97.7 °F (36.5 °C), Max:98 °F (36.7 °C)       Intake/Output Summary (Last 24 hours) at 11/10/2024 1638  Last data filed at 11/10/2024 1500  Gross per 24 hour   Intake --   Output 325 ml   Net -325 ml     Wt Readings from Last 3 Encounters:   11/10/24 (!) 304 lb 7.3 oz   24 275 lb   24 266 lb 12.8 oz       Physical Exam:   General: Alert and oriented x 3.    HEENT: Normocephalic, anicteric sclera, neck supple.  Neck: No JVD   Cardiac: Regular rate and rhythm. S1, S2 normal.   Lungs: Clear anteriorly  Extremities: Without edema  Neurologic: Alert and oriented, normal affect.  Skin: Warm and dry.     Laboratory Data:  Lab Results   Component Value Date    WBC 9.2 11/10/2024    HGB 12.5 11/10/2024    HCT 39.6 11/10/2024    .0 11/10/2024    CREATSERUM 4.12 11/10/2024    BUN 45 11/10/2024     11/10/2024    K 4.7 11/10/2024     11/10/2024    CO2 22.0 11/10/2024     11/10/2024    CA 8.9 11/10/2024    PTT 27.0 11/10/2024    MG  2.1 11/10/2024    PGLU 144 11/10/2024       Imaging:  Atrial flutter with controlled ventricular rates  Echo 7/22/2024:  1. Left ventricle: The cavity size was normal. Wall thickness was mildly      increased. Systolic function was normal. The estimated ejection fraction      was 55-60%, by visual assessment. Doppler parameters are consistent with      abnormal left ventricular relaxation - grade 1 diastolic dysfunction.   2. Left atrium: The atrium was increased in size. The left atrial volume was      mildly increased.   3. Mitral valve: There was mild regurgitation.   4. Pulmonary arteries: Systolic pressure was within the normal range,      estimated to be 23mm Hg.   Impressions:  No previous study was available for comparison.     Nuc normal 7/22/2024    Impression:  Principal Problem:    Acute congestive heart failure, unspecified heart failure type (HCC)  Active Problems:    NSTEMI (non-ST elevated myocardial infarction) (HCC)    Atrial fibrillation and flutter (HCC)  CKD  DM2  HTN  HLP    Elevated troponin due to CKD and serum creat up to 4.  Will repeat 2 hr troponin and again in AM tomorrow, if flat no additional cardiac testing needed in setting of recent stress test during last admission.  If troponin trending up will have to discuss with nephrology and interventional cardiology risks/benefits of diagnostic LHC in setting of rising serum creat.    Rate control strategy for atrial flutter.  Resume home cardiac regimen including amlodipine 5 mg daily and coreg 25 mg BID. Continue IV heparin for now, can switch to eliquis 2.5 mg BID tomorrow afternoon at the earliest - depending on clinical course/repeat blood work.    Start rosuvastatin 10 mg for HLP    Thank you for allowing me to participate in the care of your patient.    Patrick Black MD  11/10/2024  4:38 PM         [1] No Known Allergies
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
## 2024-11-11 NOTE — CM/SW NOTE
11/11/24 1300   CM/SW Referral Data   Referral Source Social Work (self-referral);Physician   Reason for Referral Discharge planning;Financial issues   Informant Patient   Medical Hx   Does patient have an established PCP? Yes   Significant Past Medical/Mental Health Hx DM2, CKD4   Patient Info   Patient's Current Mental Status at Time of Assessment Alert;Oriented   Patient's Home Environment Condo/Apt no elevator   Number of Levels in Home 1   Number of Stair in Home 0   Patient lives with Alone   Patient Status Prior to Admission   Independent with ADLs and Mobility Yes   Discharge Needs   Anticipated D/C needs To be determined     Social work was able to meet with the patient at bedside along with his daughters.    The patient lives alone and is independent with all ADLs.    The patient in uninsured and DOES NOT qualify for Medicaid due to being over income.    Social work provided the patient the following resources at bedside and added to patient instructions:  [employer] Healthcare Plans:  https://www.[employer].gov/  OPEN ENROLLMENT DEADLINE DECEMBER 15TH FOR COVERAGE EFFECTIVE JANUARY 1ST.    [facility] CLINIC:  [facility]  [address], [place]  [place]: [phone]  Family Practice M-Th 8am-8pm Fri-Sun 8am-4pm  Pharmacy M-Th 8:30am-6:00pm Fri-Sat 8:30am-4:00pm    [facility]  [address], [place]  [place]: [phone]  Family Practice M-Sat 8:00am-4:00pm    Social work will follow up if there are any further discharge needs.    SW/CM to remain available for support and/or discharge planning.     Viviane Lewis MSW, LSW  Discharge Planner [id number]

## 2024-11-11 NOTE — PROGRESS NOTES
Progress Note  Troy Gustafson Sr. Patient Status:  Inpatient    3/11/1970 MRN [id number]   Location [facility] 3W/SW Attending Felipa Denny MD   Hosp Day # 1 PCP None Pcp     Subjective:  Pt states his SOB is improved today. He states he has had worsening dyspnea with walking, LE swelling and unable to sleep flat.     Objective:  BP (!) 162/105 (BP Location: Left arm)   Pulse 63   Temp 97.9 °F (36.6 °C) (Oral)   Resp 16   Ht 6' 3\" (1.905 m)   Wt 292 lb 1.6 oz (132.5 kg)   SpO2 98%   BMI 36.51 kg/m²     Telemetry: aflutter    Intake/Output:    Intake/Output Summary (Last 24 hours) at 2024 1018  Last data filed at 2024 0600  Gross per 24 hour   Intake 935.6 ml   Output 1360 ml   Net -424.4 ml       Last 3 Weights   24 0600 292 lb 1.6 oz (132.5 kg)   11/10/24 1831 294 lb (133.4 kg)   11/10/24 1237 (!) 304 lb 7.3 oz (138.1 kg)   11/10/24 1207 265 lb (120.2 kg)   24 1128 275 lb (124.7 kg)   24 0500 266 lb 12.8 oz (121 kg)   24 0400 269 lb (122 kg)   24 0459 269 lb 6.4 oz (122.2 kg)   24 0243 266 lb (120.7 kg)   24 0028 269 lb 10 oz (122.3 kg)   24 0027 269 lb 6.4 oz (122.2 kg)       Labs:  Recent Labs   Lab 11/10/24  1229 24  0433   * 149*   BUN 45* 46*   CREATSERUM 4.12* 4.20*   EGFRCR 16* 16*   CA 8.9 8.6*    140   K 4.7 4.5    113*   CO2 22.0 20.0*     Recent Labs   Lab 11/10/24  1229 24  0433   RBC 4.64 4.34   HGB 12.5* 11.7*   HCT 39.6 39.1   MCV 85.3 90.1   MCH 26.9 27.0   MCHC 31.6 29.9*   RDW 14.3 14.3   NEPRELIM 7.54 5.26   WBC 9.2 8.9   .0 249.0         Recent Labs   Lab 11/10/24  1229 11/10/24  1656 24  0433   TROPHS 329* 288* 275*       Diagnostics:  XR CHEST AP PORTABLE  (CPT=71045)    Result Date: 11/10/2024  CONCLUSION:   Right pleural effusion and associated atelectasis.  Superimposed pneumonia is not excluded.  These findings are also seen on the prior radiograph of 2024  and may be chronic.    Dictated by (CST): Davide Toro MD on 11/10/2024 at 2:07 PM     Finalized by (CST): Davide Toro MD on 11/10/2024 at 2:08 PM          Review of Systems   Cardiovascular:  Positive for dyspnea on exertion, leg swelling and orthopnea. Negative for chest pain.   Respiratory:  Positive for cough and shortness of breath.    Gastrointestinal:  Positive for bloating. Negative for abdominal pain.       Physical Exam:    Gen: alert, oriented x 3, NAD  Heent: pupils equal, reactive. Mucous membranes moist.   Neck: no jvd (sitting at 90)  Cardiac: regular rate and rhythm, normal S1,S2, no murmur, clicks, rub or gallop  Lungs: diminished, R>L  Abd: soft, distended +bs  Ext: BLE edema, taut  Skin: Warm, dry  Neuro: No focal deficits      Medications:     furosemide  40 mg Intravenous BID (Diuretic)    insulin degludec  8 Units Subcutaneous Nightly    insulin aspart  1-7 Units Subcutaneous TID CC    amLODIPine  5 mg Oral Daily    rosuvastatin  5 mg Oral Nightly    apixaban  2.5 mg Oral BID      continuous dose heparin 1,400 Units/hr (11/11/24 0509)         Assessment:  Paroxysmal Atrial Fibrillation, Flutter  Remains aflutter 60's  On BB  [id number] 3 - on IV heparin; previously prescribed Eliquis at OP visit but did not take  Acute HFpEF  , CXR w/R pleural effusion   7/2024 Echo LVEF 55-60%, G1DD, mildly increased LA, mild MR  Diuresis per nephrology - IV Lasix BID   Elevated Troponin, Likely Type II  Trop flat trend - 329>288>275  ECG - aflutter, non-spec TW abnormality  7/2024 Nuc stress w/normal perfusion  On IV heparin gtt   BRUNA on CKD  Cr 4.20 today  Worsening renal function - may need HD   Mgmt per nephrology   Hypertension  Previously prescribed coreg, amlodipine - pt not taking at home  Chronic Anemia - Hgb stable  Hyperlipidemia - LDL 87, on rosuvastatin 5mg    Plan:  Continue IV heparin gtt for now in case of need for procedure (poss HD)  Social work to check pricing for Eliquis 2.5mg  po BID today - may not be an affordable option for him  Continue coreg, amlodipine   Continue statin  Diuresis w/IV Lasix per nephrology - appreciate their recommendations.    Plan of care discussed with patient, RN.    ALBERTO Dee  11/11/2024  10:18 AM  [phone] [facility]  [phone] [facility]        Seen/examined patient independently.  Agree with findings, assessment and plan as outlined above.     Patient reports improved breathing.  Denies any chest pain.  Telemetry with 4:1 AV flutter.    In regards to volume overload, likely multifactorial: Acute on chronic CKD and HFpEF.  Appreciate volume optimization per nephrology, currently on IV Lasix with 1300 cc of urine output in the last 24 hours.    In regards to persistent a flutter, currently rate controlled, on coreg.  Currently on heparin drip but will transition to oral anticoagulant prior to discharge for elevated [id number].    In regards to elevated troponin, trend is not indicative of acute ischemia, possibly demand mediated versus non-MI related troponin elevation.  Patient had recent stress MPI testing in July 2024 that was normal.    Sherif Zheng, DO

## 2024-11-11 NOTE — PLAN OF CARE
Contacted by nursing team. Patient had an episode of dizziness while ambulated. No LOC.  Once in bed his symptoms resolved - currently asymptomatic. BP remains elevated in the 150-160s and orthostatic BP was negative. On telemetry he remained in flutter throughout exercise and now in bed. Suspect poor chronotropic competence may have contributed to symptoms. Will reduce coreg to 12.5 mg BID and increase amlodipine to 10 mg daily for continued hypertension control. Continue PRN hydralazine as well. They will discuss diuretics with nephrology.

## 2024-11-11 NOTE — CONSULTS
[facility]  part of [facility]    Report of Consultation    Troy Gustafson Sr. Patient Status:  Inpatient    3/11/1970 MRN [id number]   Location [facility] 3W/SW Attending Felipa Denny MD   Hosp Day # 1 PCP None Pcp     Date of Admission:  11/10/2024  Date of Consult:  2024   Reason for Consultation:   BRUNA    History of Present Illness:   Patient is a 54 year old male who was admitted to the hospital for Acute congestive heart failure, unspecified heart failure type (HCC):  Patient has a history of advanced chronic kidney disease with a baseline creatinine of 3.5.  Last GFR from July was 22.    The patient came in yesterday with complaints of shortness of breath.  He also had chest pain.  He is unable to lay flat and has been developing facial edema as well as lower extremity edema.    The patient does not have insurance and has not been taking his medications regularly.  He normally follows up with Dr. ANTIONE Vance, due to insurance issues has not been able to see him.    He received a dose of IV Lasix in the emergency room.  Creatinine today is up to 4.2 with a BUN of 46.  The patient says he feels better      Past Medical History  Past Medical History:    Diabetes (HCC)    Essential hypertension       Past Surgical History  History reviewed. No pertinent surgical history.    Family History  No family history on file.    Social History  Social History     Socioeconomic History    Marital status:    Tobacco Use    Smoking status: Never    Smokeless tobacco: Never   Substance and Sexual Activity    Alcohol use: Never    Drug use: Never     Social Drivers of Health     Food Insecurity: No Food Insecurity (11/10/2024)    Food Insecurity     Food Insecurity: Never true   Transportation Needs: No Transportation Needs (11/10/2024)    Transportation Needs     Lack of Transportation: No   Housing Stability: Low Risk  (11/10/2024)    Housing Stability     Housing Instability:  No       Current Medications:  Current Facility-Administered Medications   Medication Dose Route Frequency    glucose (Dex4) 15 GM/59ML oral liquid 15 g  15 g Oral Q15 Min PRN    Or    glucose (Glutose) 40% oral gel 15 g  15 g Oral Q15 Min PRN    Or    glucose-vitamin C (Dex-4) chewable tab 4 tablet  4 tablet Oral Q15 Min PRN    Or    dextrose 50% injection 50 mL  50 mL Intravenous Q15 Min PRN    Or    glucose (Dex4) 15 GM/59ML oral liquid 30 g  30 g Oral Q15 Min PRN    Or    glucose (Glutose) 40% oral gel 30 g  30 g Oral Q15 Min PRN    Or    glucose-vitamin C (Dex-4) chewable tab 8 tablet  8 tablet Oral Q15 Min PRN    insulin degludec (Tresiba) 100 units/mL flextouch 8 Units  8 Units Subcutaneous Nightly    insulin aspart (NovoLOG) 100 Units/mL FlexPen 1-7 Units  1-7 Units Subcutaneous TID CC    acetaminophen (Tylenol Extra Strength) tab 500 mg  500 mg Oral Q4H PRN    melatonin tab 3 mg  3 mg Oral Nightly PRN    ondansetron (Zofran) 4 MG/2ML injection 4 mg  4 mg Intravenous Q6H PRN    prochlorperazine (Compazine) 10 MG/2ML injection 5 mg  5 mg Intravenous Q8H PRN    polyethylene glycol (PEG 3350) (Miralax) 17 g oral packet 17 g  17 g Oral Daily PRN    sennosides (Senokot) tab 17.2 mg  17.2 mg Oral Nightly PRN    bisacodyl (Dulcolax) 10 MG rectal suppository 10 mg  10 mg Rectal Daily PRN    amLODIPine (Norvasc) tab 5 mg  5 mg Oral Daily    hydrALAZINE (Apresoline) tab 25 mg  25 mg Oral Q6H PRN    rosuvastatin (Crestor) tab 5 mg  5 mg Oral Nightly    apixaban (Eliquis) tab 2.5 mg  2.5 mg Oral BID    heparin (Porcine) 73903 units/250mL infusion ACS/AFIB CONTINUOUS  200-3,000 Units/hr Intravenous Continuous     Medications Prior to Admission   Medication Sig    amLODIPine 10 MG Oral Tab Take 1 tablet (10 mg total) by mouth daily.    carvedilol 12.5 MG Oral Tab Take 1 tablet (12.5 mg total) by mouth 2 (two) times daily with meals.    Insulin NPH & Regular (NOVOLIN 70/30 FLEXPEN RELION) (70-30) 100 UNIT/ML Subcutaneous  Suspension Pen-injector Test blood glucose before breakfast and dinner Inject 12 units 30 minutes before breakfast daily Inject 12 units 30 minutes before dinner daily    Glucose Blood (ONETOUCH VERIO) In Vitro Strip Use to check blood sugars twice daily    Insulin Pen Needle (BD PEN NEEDLE MIKEY U/F) 32G X 4 MM Does not apply Misc Use a new pen needle with each injection as directed by your doctor    OneTouch Delica Lancets 33G Does not apply Misc Use to check blood sugar twice daily       Allergies  Allergies[1]    Review of Systems:     General: weak         A comprehensive 12 point review of systems was completed.  Pertinent positives as above and all the rest were negative.     Physical Exam:   BP (!) 154/94 (BP Location: Left arm)   Pulse 63   Temp 98 °F (36.7 °C) (Oral)   Resp 16   Ht 6' 3\" (1.905 m)   Wt 292 lb 1.6 oz (132.5 kg)   SpO2 97%   BMI 36.51 kg/m²      Intake/Output Summary (Last 24 hours) at 11/11/2024 0949  Last data filed at 11/11/2024 0600  Gross per 24 hour   Intake 935.6 ml   Output 1360 ml   Net -424.4 ml     Wt Readings from Last 1 Encounters:   11/11/24 292 lb 1.6 oz (132.5 kg)       Exam  Gen: No acute distress  Heent: NC AT, mucous memb clear, neck supple  Pulm: Lungs coarse, normal respiratory effort  CV: Heart with regular rate and rhythm, edema  Abd: Abdomen soft, nontender, nondistended, no organomegaly, bowel sounds present  Skin: no symptoms reported  Psych: alert and oriented        Results:     Laboratory Data:  Recent Labs   Lab 11/10/24  1229 11/11/24  0433   RBC 4.64 4.34   HGB 12.5* 11.7*   HCT 39.6 39.1   MCV 85.3 90.1   MCH 26.9 27.0   MCHC 31.6 29.9*   RDW 14.3 14.3   NEPRELIM 7.54 5.26   WBC 9.2 8.9   .0 249.0         Recent Labs   Lab 11/10/24  1229 11/11/24  0433   * 149*   BUN 45* 46*   CREATSERUM 4.12* 4.20*   CA 8.9 8.6*    140   K 4.7 4.5    113*   CO2 22.0 20.0*        Imaging:  XR CHEST AP PORTABLE  (CPT=71045)    Result Date:  11/10/2024  CONCLUSION:   Right pleural effusion and associated atelectasis.  Superimposed pneumonia is not excluded.  These findings are also seen on the prior radiograph of July 2024 and may be chronic.    Dictated by (CST): Davide Toro MD on 11/10/2024 at 2:07 PM     Finalized by (CST): Davide Toro MD on 11/10/2024 at 2:08 PM                   Impression/Receommendations:     1 - BRUNA  I discussed the patient's kidney status with himself and with his daughter Becky who is in the room.  I explained to the him that his kidney function and is much worse, and his GFR is now 16.  I told him he is on the verge of needing dialysis.    We will have to optimize him including diurese him to see if his kidney function gets better.  If it does not get better, and of course if he gets worse we will have to initiate renal replacement therapies (dialysis).    The patient was very upset to hear this, but he and his daughter understand    For now we will begin diuretics and see how things go    2 - CHFpEF/pulmonary hypertension/NSTEMI/A-fib  Patient may need a left heart cath which will assuredly put him into renal failure.  Cardiology is following him.    3 -hypertension with CKD  He is on amlodipine    4 -diabetic nephropathy  Accu-Cheks    Thank you for allowing me to participate in the care of your patient.    SB LOCKHART MD  11/11/2024        [1] No Known Allergies

## 2024-11-11 NOTE — PLAN OF CARE
Problem: Patient Centered Care  Goal: Patient preferences are identified and integrated in the patient's plan of care  Description: Interventions:  - What would you like us to know as we care for you? From home alone  - Provide timely, complete, and accurate information to patient/family  - Incorporate patient and family knowledge, values, beliefs, and cultural backgrounds into the planning and delivery of care  - Encourage patient/family to participate in care and decision-making at the level they choose  - Honor patient and family perspectives and choices  Outcome: Progressing     Problem: Diabetes/Glucose Control  Goal: Glucose maintained within prescribed range  Description: INTERVENTIONS:  - Monitor Blood Glucose as ordered  - Assess for signs and symptoms of hyperglycemia and hypoglycemia  - Administer ordered medications to maintain glucose within target range  - Assess barriers to adequate nutritional intake and initiate nutrition consult as needed  - Instruct patient on self management of diabetes  Outcome: Progressing     Problem: CARDIOVASCULAR - ADULT  Goal: Maintains optimal cardiac output and hemodynamic stability  Description: INTERVENTIONS:  - Monitor vital signs, rhythm, and trends  - Monitor for bleeding, hypotension and signs of decreased cardiac output  - Evaluate effectiveness of vasoactive medications to optimize hemodynamic stability  - Monitor arterial and/or venous puncture sites for bleeding and/or hematoma  - Assess quality of pulses, skin color and temperature  - Assess for signs of decreased coronary artery perfusion - ex. Angina  - Evaluate fluid balance, assess for edema, trend weights  Outcome: Progressing  Goal: Absence of cardiac arrhythmias or at baseline  Description: INTERVENTIONS:  - Continuous cardiac monitoring, monitor vital signs, obtain 12 lead EKG if indicated  - Evaluate effectiveness of antiarrhythmic and heart rate control medications as ordered  - Initiate emergency  measures for life threatening arrhythmias  - Monitor electrolytes and administer replacement therapy as ordered  Outcome: Progressing     Problem: Patient/Family Goals  Goal: Patient/Family Long Term Goal  Description: Patient's Long Term Goal: to return home    Interventions:  - take medications as prescribed  - follow MD orders  - monitor labs  - See additional Care Plan goals for specific interventions  Outcome: Progressing  Goal: Patient/Family Short Term Goal  Description: Patient's Short Term Goal: to breathe better    Interventions:   - diurese  - monitor labs  - strict I&O  - See additional Care Plan goals for specific interventions  Outcome: Progressing     Patient alert and oriented x4.  No complaints of pain at this time.  Call light within reach.  Heparin drip in progress.  Patient is a self care.

## 2024-11-11 NOTE — DISCHARGE INSTRUCTIONS
Hemodialysis Information:  Your first treatment is: Wednesday, November 20, 2024 at 05:55 AM  Your tentative dialysis schedule is: MONDAY, WEDNESDAY, FRIDAY at 05:55 AM  [facility]   [phone]  [address] [place] [address]    [employer] Healthcare Plans:  [email or web address].gov/  OPEN ENROLLMENT DEADLINE DECEMBER 15TH FOR COVERAGE EFFECTIVE JANUARY 1ST.    [facility] CLINIC:  [facility]  [address], [place]  Main Line: [phone]  Family Practice M-Th 8am-8pm Fri-Sun 8am-4pm  Pharmacy M-Th 8:30am-6:00pm Fri-Sat 8:30am-4:00pm    [facility]  [address], [place]  Main Line: [phone]  [facility] M-Sat 8:00am-4:00pm    Blood thinner:  Please note that you have been started on a blood thinner called eliquis.  This can increase your risk for bleeding. Please monitor for signs of excess bleeding, check your stool regularly to look for bleeding, if you experience any uncontrolled bleeding or have a fall or direct trauma please seek medical assistance.        Going Home Instructions  In this section you will find the tools which will guide you through the first few days after you leave the hospital. Continued use of these tools will help you develop the skills necessary to keep your heart failure under control.     Home Care Instructions Following Heart Failure - the most important things to do every day include:   Weigh yourself and review the “Self-Check Plan” sheet every morning.   Call your cardiologist office if you are in the “Pay Attention-Use Caution” (yellow zone) or “Medical Alert-Warning!” (red zone) as outlined in the Self-Check Plan sheet.  Take your medicines as prescribed.  Limit your sodium (salt) intake.  Know when to call your cardiologist, primary doctor, or nurse.  Know when to seek emergency care.      Things for You to Remember:   1. See your doctor or healthcare provider as written on your discharge  instructions.  It is important that you attend this appointment to make sure your symptoms are under control.     2. Your recommended sodium intake is 6434-0225 mg daily.    3.  Weigh yourself every day.    4. Some exercise and activity is important to help keep your heart functioning and strong. Unless instructed not to exercise, you may walk at a slow to moderate pace for 10-15 minutes 2-3 days per week to start. Pace your activity to prevent shortness of breath or fatigue. Stop exercising if you develop chest pain, lightheadedness, or significant shortness of breath.       Call Your Cardiologist If:   You gain 2-3 pounds in one day or 5 pounds in one week.  You have more difficulty breathing.  You are getting more tired with normal activity.  You are more short of breath lying down, or awaken at night short of breath.  You have swelling of your feet or legs.  You urinate less often during the day and more often at night.  You have cramps in your legs.  You have blurred vision or see yellowish-green halos around objects of lights.    Go to the Emergency Room If:   You have pain or tightness in your chest  You are extremely short of breath  You are coughing up pink-frothy mucus  You are traveling and develop symptoms of worsening heart failure      ** Please follow up with your cardiologist or Advanced Practice Provider as written on your discharge instructions. If you are not provided with an appointment, let your nurse know so you can get an appointment**

## 2024-11-12 NOTE — PLAN OF CARE
Problem: Patient Centered Care  Goal: Patient preferences are identified and integrated in the patient's plan of care  Description: Interventions:  - What would you like us to know as we care for you? From home with family  - Provide timely, complete, and accurate information to patient/family  - Incorporate patient and family knowledge, values, beliefs, and cultural backgrounds into the planning and delivery of care  - Encourage patient/family to participate in care and decision-making at the level they choose  - Honor patient and family perspectives and choices  Outcome: Progressing     Problem: Diabetes/Glucose Control  Goal: Glucose maintained within prescribed range  Description: INTERVENTIONS:  - Monitor Blood Glucose as ordered  - Assess for signs and symptoms of hyperglycemia and hypoglycemia  - Administer ordered medications to maintain glucose within target range  - Assess barriers to adequate nutritional intake and initiate nutrition consult as needed  - Instruct patient on self management of diabetes  Outcome: Progressing     Problem: Patient/Family Goals  Goal: Patient/Family Long Term Goal  Description: Patient's Long Term Goal: to return home    Interventions:  - take medications as prescribed  - follow MD orders  - monitor labs  - See additional Care Plan goals for specific interventions  Outcome: Progressing  Goal: Patient/Family Short Term Goal  Description: Patient's Short Term Goal: to breathe better    Interventions:   - diurese  - monitor labs  - strict I&O  - See additional Care Plan goals for specific interventions  Outcome: Progressing     Problem: CARDIOVASCULAR - ADULT  Goal: Maintains optimal cardiac output and hemodynamic stability  Description: INTERVENTIONS:  - Monitor vital signs, rhythm, and trends  - Monitor for bleeding, hypotension and signs of decreased cardiac output  - Evaluate effectiveness of vasoactive medications to optimize hemodynamic stability  - Monitor arterial  and/or venous puncture sites for bleeding and/or hematoma  - Assess quality of pulses, skin color and temperature  - Assess for signs of decreased coronary artery perfusion - ex. Angina  - Evaluate fluid balance, assess for edema, trend weights  Outcome: Progressing  Goal: Absence of cardiac arrhythmias or at baseline  Description: INTERVENTIONS:  - Continuous cardiac monitoring, monitor vital signs, obtain 12 lead EKG if indicated  - Evaluate effectiveness of antiarrhythmic and heart rate control medications as ordered  - Initiate emergency measures for life threatening arrhythmias  - Monitor electrolytes and administer replacement therapy as ordered  Outcome: Progressing

## 2024-11-12 NOTE — PLAN OF CARE
Patient is alert and oriented x4 on room air able to move standby assist with staff. Heparin gtt continued. Medication adjusted per cardiology. IV lasix per nephrology. Orthostatic negative although with some dizziness this PM, cardiology aware. Family updated on plan at bedside.     Problem: Patient Centered Care  Goal: Patient preferences are identified and integrated in the patient's plan of care  Description: Interventions:  - What would you like us to know as we care for you? I am from home with family.   - Provide timely, complete, and accurate information to patient/family  - Incorporate patient and family knowledge, values, beliefs, and cultural backgrounds into the planning and delivery of care  - Encourage patient/family to participate in care and decision-making at the level they choose  - Honor patient and family perspectives and choices  Outcome: Progressing     Problem: Diabetes/Glucose Control  Goal: Glucose maintained within prescribed range  Description: INTERVENTIONS:  - Monitor Blood Glucose as ordered  - Assess for signs and symptoms of hyperglycemia and hypoglycemia  - Administer ordered medications to maintain glucose within target range  - Assess barriers to adequate nutritional intake and initiate nutrition consult as needed  - Instruct patient on self management of diabetes  Outcome: Progressing     Problem: Patient/Family Goals  Goal: Patient/Family Long Term Goal  Description: Patient's Long Term Goal: to return home    Interventions:  - take medications as prescribed  - follow MD orders  - monitor labs  - See additional Care Plan goals for specific interventions  Outcome: Progressing  Goal: Patient/Family Short Term Goal  Description: Patient's Short Term Goal: to breathe better    Interventions:   - diurese  - monitor labs  - strict I&O  - See additional Care Plan goals for specific interventions  Outcome: Progressing     Problem: CARDIOVASCULAR - ADULT  Goal: Maintains optimal cardiac  output and hemodynamic stability  Description: INTERVENTIONS:  - Monitor vital signs, rhythm, and trends  - Monitor for bleeding, hypotension and signs of decreased cardiac output  - Evaluate effectiveness of vasoactive medications to optimize hemodynamic stability  - Monitor arterial and/or venous puncture sites for bleeding and/or hematoma  - Assess quality of pulses, skin color and temperature  - Assess for signs of decreased coronary artery perfusion - ex. Angina  - Evaluate fluid balance, assess for edema, trend weights  Outcome: Progressing  Goal: Absence of cardiac arrhythmias or at baseline  Description: INTERVENTIONS:  - Continuous cardiac monitoring, monitor vital signs, obtain 12 lead EKG if indicated  - Evaluate effectiveness of antiarrhythmic and heart rate control medications as ordered  - Initiate emergency measures for life threatening arrhythmias  - Monitor electrolytes and administer replacement therapy as ordered  Outcome: Progressing

## 2024-11-12 NOTE — PROGRESS NOTES
[facility]  part of [facility]    Progress Note    Troy Gustafson Sr. Patient Status:  Inpatient    3/11/1970 MRN [id number]   Location [facility] 3W/SW Attending Felipa Denny MD   Hosp Day # 2 PCP None Pcp       Subjective:   Troy Gustafson Sr. is a(n) 54 year old male who I am seeing for BRUNA    Resting      Objective:   BP (!) 177/95 (BP Location: Left arm)   Pulse 63   Temp 97.7 °F (36.5 °C) (Oral)   Resp 16   Ht 6' 3\" (1.905 m)   Wt 289 lb 4.8 oz (131.2 kg)   SpO2 96%   BMI 36.16 kg/m²      Intake/Output Summary (Last 24 hours) at 2024 1051  Last data filed at 2024 0900  Gross per 24 hour   Intake 451.1 ml   Output 3400 ml   Net -2948.9 ml     Wt Readings from Last 1 Encounters:   24 289 lb 4.8 oz (131.2 kg)       Exam  Gen: No acute distress, Heent: NC AT, mucous memb clear, neck supple  Pulm: Lungs clear, normal respiratory effort  CV: Heart with regular rate and rhythm, no edema  Abd: Abdomen soft, nontender, nondistended, no organomegaly, bowel sounds present  Skin: no symptoms reported  Psych: alert and oriented    Assessment and Plan:       1 - BRUNA  Worse  Pt is approaching need for dialysis.  Continue twice daily Lasix for now    However he is very resistant to it.  I spoke to his daughter Becky today who will speak to the patient as well.  If he is amenable to starting dialysis I think he should begin it     2 - CHFpEF/pulmonary hypertension/NSTEMI/A-fib  Patient may need a left heart cath which will assuredly put him into renal failure.  Cardiology is following him.     3 -hypertension with CKD  He is on amlodipine     4 -diabetic nephropathy  Accu-Cheks          Results:     Recent Labs   Lab 11/10/24  1229 24  0433 24  0212   RBC 4.64 4.34 4.23*   HGB 12.5* 11.7* 11.6*   HCT 39.6 39.1 36.0*   MCV 85.3 90.1 85.1   MCH 26.9 27.0 27.4   MCHC 31.6 29.9* 32.2   RDW 14.3 14.3 14.3   NEPRELIM 7.54 5.26 4.24   WBC 9.2 8.9 8.3    .0 249.0 252.0         Recent Labs   Lab 11/10/24  1229 11/11/24  0433 11/12/24  0212   * 149* 139*  139*   BUN 45* 46* 51*  51*   CREATSERUM 4.12* 4.20* 4.32*  4.32*   CA 8.9 8.6* 8.6*  8.6*    140 143  143   K 4.7 4.5 4.2  4.2    113* 113*  113*   CO2 22.0 20.0* 22.0  22.0          XR CHEST AP PORTABLE  (CPT=71045)    Result Date: 11/10/2024  CONCLUSION:   Right pleural effusion and associated atelectasis.  Superimposed pneumonia is not excluded.  These findings are also seen on the prior radiograph of July 2024 and may be chronic.    Dictated by (CST): Davide Toro MD on 11/10/2024 at 2:07 PM     Finalized by (CST): Davide Toro MD on 11/10/2024 at 2:08 PM               SB LOCKHART MD  11/12/2024
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
## 2024-11-12 NOTE — PROGRESS NOTES
[facility]  Cardiology Progress Note    Troy Gustafson Sr. Patient Status:  Inpatient    3/11/1970 MRN [id number]   Location [facility] 3W/SW Attending Felipa Denny MD   Hosp Day # 2 PCP None Pcp     Subjective     Feeling much better, no shortness of breath or chest pain.    Objective:   Patient Vitals for the past 24 hrs:   BP Temp Temp src Pulse Resp SpO2 Weight   24 0832 (!) 177/95 97.7 °F (36.5 °C) Oral -- 16 96 % --   24 0420 150/89 98.4 °F (36.9 °C) Oral 63 16 97 % --   24 0300 -- -- -- -- -- -- 289 lb 4.8 oz (131.2 kg)   24 153/86 98.2 °F (36.8 °C) Oral 63 20 95 % --   24 1657 (!) 165/83 -- -- -- 16 96 % --   24 1654 (!) 162/93 -- -- -- 16 94 % --   24 1652 (!) 164/94 -- -- -- 16 98 % --   24 1651 (!) 165/87 -- -- -- 16 97 % --   24 1647 (!) 165/90 -- -- -- 16 96 % --     Intake/Output:   Last 3 shifts:   Intake/Output                   11/10/24 0700 - 24 0659 24 0700 - 24 0659 24 0700 - 24 0659       Intake    P.O.  790  --  240    P.O. 790 -- 240    I.V.  145.6  211.1  --    I.V. 10 -- --    Volume (mL) Heparin 135.6 211.1 --    Total Intake 935.6 211.1 240       Output    Urine  1360  2550  850    Urine 1360 2550 850    Urine Occurrence 3 x 3 x --    Incontinent Urine Occurrence 0 x -- --    Total Output 1360 2550 850       Net I/O     -424.4 -2338.9 -610           Scheduled Meds:    furosemide  40 mg Intravenous BID (Diuretic)    insulin degludec  12 Units Subcutaneous Nightly    carvedilol  12.5 mg Oral BID with meals    amLODIPine  10 mg Oral Daily    insulin aspart  1-7 Units Subcutaneous TID CC    rosuvastatin  5 mg Oral Nightly    [Held by provider] apixaban  2.5 mg Oral BID     Continuous Infusions:    continuous dose heparin 1,800 Units/hr (24 0655)     Results:     Lab Results   Component Value Date    WBC 8.3 2024    HGB 11.6 (L) 2024    HCT 36.0 (L)  1930- Bedside shift change report given to NESHA Juarez RN (oncoming nurse) by Priscilla Porras RN (offgoing nurse). Report included the following information SBAR, Kardex, Intake/Output, MAR and Recent Results. Assumed care of patient. 2000- Assessment completed, no signs of pain or distress at this time. 0000- Patient reassessed, no changes noted at this time. 0400- Patient reassessed, no changes noted at this time. 36- Spoke with Dr. Venita Jimenez concerning patients sedation, orders given to start Precedex and wean off Ketamine and Fentanyl. 36- Precedex started, Ketamine rate reduced. 0730- Bedside shift change report given to DWAYNE Porras RN (oncoming nurse) by NESHA Juarez RN (offgoing nurse). Report included the following information SBAR, Kardex, Intake/Output, MAR, Recent Results and Cardiac Rhythm ST. Released care to day shift RN. 11/12/2024    .0 11/12/2024    CREATSERUM 4.32 (H) 11/12/2024    CREATSERUM 4.32 (H) 11/12/2024    BUN 51 (H) 11/12/2024    BUN 51 (H) 11/12/2024     11/12/2024     11/12/2024    K 4.2 11/12/2024    K 4.2 11/12/2024     (H) 11/12/2024     (H) 11/12/2024    CO2 22.0 11/12/2024    CO2 22.0 11/12/2024     (H) 11/12/2024     (H) 11/12/2024    CA 8.6 (L) 11/12/2024    CA 8.6 (L) 11/12/2024    ALB 3.2 11/12/2024    ALKPHO 108 07/21/2024    ALKPHO 110 07/21/2024    BILT 0.5 07/21/2024    BILT 0.4 07/21/2024    TP 6.7 07/21/2024    TP 6.5 07/21/2024    AST 33 07/21/2024    AST 32 07/21/2024    ALT 35 07/21/2024    ALT 39 07/21/2024    PTT 67.6 (H) 11/12/2024    TSH 4.503 07/21/2024    MG 2.1 11/10/2024    PHOS 5.0 11/12/2024     (H) 07/23/2024       Recent Labs   Lab 11/10/24  1229 11/11/24  0433 11/12/24  0212   * 149* 139*  139*   BUN 45* 46* 51*  51*   CREATSERUM 4.12* 4.20* 4.32*  4.32*   CA 8.9 8.6* 8.6*  8.6*    140 143  143   K 4.7 4.5 4.2  4.2    113* 113*  113*   CO2 22.0 20.0* 22.0  22.0     Recent Labs   Lab 11/10/24  1229 11/11/24  0433 11/12/24 0212   RBC 4.64 4.34 4.23*   HGB 12.5* 11.7* 11.6*   HCT 39.6 39.1 36.0*   MCV 85.3 90.1 85.1   MCH 26.9 27.0 27.4   MCHC 31.6 29.9* 32.2   RDW 14.3 14.3 14.3   NEPRELIM 7.54 5.26 4.24   WBC 9.2 8.9 8.3   .0 249.0 252.0       Recent Labs   Lab 11/10/24  1229   BNPML 758*       No results for input(s): \"TROP\", \"CK\" in the last 168 hours.    XR CHEST AP PORTABLE  (CPT=71045)    Result Date: 11/10/2024  CONCLUSION:   Right pleural effusion and associated atelectasis.  Superimposed pneumonia is not excluded.  These findings are also seen on the prior radiograph of July 2024 and may be chronic.    Dictated by (CST): Davide Toro MD on 11/10/2024 at 2:07 PM     Finalized by (CST): Davide Toro MD on 11/10/2024 at 2:08 PM                    Exam:     General: Alert and oriented x 3. No  apparent distress.   HEENT: Normocephalic, anicteric sclera, neck supple, no thyromegaly or adenopathy.  Neck: No JVD, carotids 2+, no bruits.  Cardiac: Regular rate and rhythm. S1, S2 normal. No murmur, pericardial rub, S3, or extra cardiac sounds.  Lungs: Clear without wheezes, rales, rhonchi or dullness.  Normal excursions and effort.  Abdomen: Soft, non-tender. No organosplenomegally, mass or rebound, BS-present.  Extremities: Without clubbing or cyanosis. 1+ LE edema.    Neurologic: Alert and oriented, normal affect. No focal defects  Skin: Warm and dry.     Assessment and Plan:   Assessment:  Paroxysmal Atrial Fibrillation, Flutter  Remains aflutter 60's  On BB  [id number] 3 - on IV heparin; previously prescribed Eliquis at OP visit but did not take  Acute HFpEF  , CXR w/R pleural effusion   7/2024 Echo LVEF 55-60%, G1DD, mildly increased LA, mild MR  Diuresis per nephrology - IV Lasix BID   Elevated Troponin, Likely Type II  Trop flat trend - 329>288>275  ECG - aflutter, non-spec TW abnormality  7/2024 Nuc stress w/normal perfusion  On IV heparin gtt   BRUNA on CKD  Cr 4.23 today  Worsening renal function - may need HD   Mgmt per nephrology   Hypertension  Previously prescribed coreg, amlodipine - pt not taking at home  Chronic Anemia - Hgb stable  Hyperlipidemia - LDL 87, on rosuvastatin 5mg     Plan:  I had a long discussion with patient regarding the need to likely initiate dialysis, he will continue to think about this and have further discussions with nephrology  Continue IV heparin gtt for now in case of need for procedure (poss HD)  Continue coreg, amlodipine; further up titration of carvedilol limited due to bradycardia  Start hydralazine 25 mg 3 times daily for persistent hypertension, discussed with patient that blood pressure management would be easier if and when he initiates dialysis    Ross Hanley MD  [facility]  11/12/2024

## 2024-11-12 NOTE — PLAN OF CARE
Patient is alert and oriented x4 on room air able to move standby assist no device. Heparin gtt continued. IV lasix continued. Patient walked in [place] with staff today with no dizziness.     Problem: Patient Centered Care  Goal: Patient preferences are identified and integrated in the patient's plan of care  Description: Interventions:  - What would you like us to know as we care for you? I don't want to do dialysis,  - Provide timely, complete, and accurate information to patient/family  - Incorporate patient and family knowledge, values, beliefs, and cultural backgrounds into the planning and delivery of care  - Encourage patient/family to participate in care and decision-making at the level they choose  - Honor patient and family perspectives and choices  Outcome: Progressing     Problem: Diabetes/Glucose Control  Goal: Glucose maintained within prescribed range  Description: INTERVENTIONS:  - Monitor Blood Glucose as ordered  - Assess for signs and symptoms of hyperglycemia and hypoglycemia  - Administer ordered medications to maintain glucose within target range  - Assess barriers to adequate nutritional intake and initiate nutrition consult as needed  - Instruct patient on self management of diabetes  Outcome: Progressing     Problem: Patient/Family Goals  Goal: Patient/Family Long Term Goal  Description: Patient's Long Term Goal: to return home    Interventions:  - take medications as prescribed  - follow MD orders  - monitor labs  - See additional Care Plan goals for specific interventions  Outcome: Progressing  Goal: Patient/Family Short Term Goal  Description: Patient's Short Term Goal: to breathe better    Interventions:   - diurese  - monitor labs  - strict I&O  - See additional Care Plan goals for specific interventions  Outcome: Progressing     Problem: CARDIOVASCULAR - ADULT  Goal: Maintains optimal cardiac output and hemodynamic stability  Description: INTERVENTIONS:  - Monitor vital signs, rhythm,  and trends  - Monitor for bleeding, hypotension and signs of decreased cardiac output  - Evaluate effectiveness of vasoactive medications to optimize hemodynamic stability  - Monitor arterial and/or venous puncture sites for bleeding and/or hematoma  - Assess quality of pulses, skin color and temperature  - Assess for signs of decreased coronary artery perfusion - ex. Angina  - Evaluate fluid balance, assess for edema, trend weights  Outcome: Progressing  Goal: Absence of cardiac arrhythmias or at baseline  Description: INTERVENTIONS:  - Continuous cardiac monitoring, monitor vital signs, obtain 12 lead EKG if indicated  - Evaluate effectiveness of antiarrhythmic and heart rate control medications as ordered  - Initiate emergency measures for life threatening arrhythmias  - Monitor electrolytes and administer replacement therapy as ordered  Outcome: Progressing
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
## 2024-11-13 NOTE — BRIEF PROCEDURE NOTE
[facility]  part of [facility]  Procedure Note    Troy Gustafson Sr. Patient Status:  Inpatient    3/11/1970 MRN [id number]   Location [facility] INTERVENTIONAL SUITES Attending Calvin Charlton MD   Hosp Day # 3 PCP None Pcp     Procedure: R internal jugular Permcath    Pre-Procedure Diagnosis:  ESRD    Post-Procedure Diagnosis: ESRD    Anesthesia:  Sedation    Findings:  tunneled Permacath via R IJ     Blood Loss:  minimal      Complications:  None    Plan:   catheter ok for use    Ramón Fam MD  2024

## 2024-11-13 NOTE — PROGRESS NOTES
Progress Note  Troy Gustafson Sr. Patient Status:  Inpatient    3/11/1970 MRN [id number]   Location [facility] 3W/SW Attending Calvin Charlton MD   Hosp Day # 3 PCP None Pcp     SUBJECTIVE:    Denies chest pain or dyspnea. Tearful about dialysis but agreeable. Family at bedside and very supportive.     VITALS:  BP (!) 163/88 (BP Location: Left arm)   Pulse 63   Temp 98 °F (36.7 °C) (Oral)   Resp 20   Ht 6' 3\" (1.905 m)   Wt 279 lb 12.8 oz (126.9 kg)   SpO2 95%   BMI 34.97 kg/m²     INTAKE/OUTPUT:    Intake/Output Summary (Last 24 hours) at 2024 0945  Last data filed at 2024 0817  Gross per 24 hour   Intake 427.8 ml   Output 2375 ml   Net -1947.2 ml     Last 3 Weights   24 0514 279 lb 12.8 oz (126.9 kg)   24 0300 289 lb 4.8 oz (131.2 kg)   24 0600 292 lb 1.6 oz (132.5 kg)   11/10/24 1831 294 lb (133.4 kg)   11/10/24 1237 (!) 304 lb 7.3 oz (138.1 kg)   11/10/24 1207 265 lb (120.2 kg)   24 1128 275 lb (124.7 kg)   24 0500 266 lb 12.8 oz (121 kg)   24 0400 269 lb (122 kg)   24 0459 269 lb 6.4 oz (122.2 kg)   24 0243 266 lb (120.7 kg)   24 0028 269 lb 10 oz (122.3 kg)   24 0027 269 lb 6.4 oz (122.2 kg)     LABS:  Recent Labs   Lab 24  0433 24  0212 24  0615   * 139*  139* 119*  119*   BUN 46* 51*  51* 52*  52*   CREATSERUM 4.20* 4.32*  4.32* 4.17*  4.17*   EGFRCR 16* 15*  15* 16*  16*   CA 8.6* 8.6*  8.6* 8.7  8.7    143  143 141  141   K 4.5 4.2  4.2 4.4  4.4   * 113*  113* 111  111   CO2 20.0* 22.0  22.0 22.0  22.0     Recent Labs   Lab 24  0433 24  0212 24  0615   RBC 4.34 4.23* 4.19*   HGB 11.7* 11.6* 11.6*   HCT 39.1 36.0* 35.6*   MCV 90.1 85.1 85.0   MCH 27.0 27.4 27.7   MCHC 29.9* 32.2 32.6   RDW 14.3 14.3 14.4   NEPRELIM 5.26 4.24 4.44   WBC 8.9 8.3 7.4   .0 252.0 261.0     No results for input(s): \"TROP\", \"CK\" in the last 168  hours.    DIAGNOSTICS:    TELEMETRY: Aflutter 60s    ROS: Negative unless noted above     PHYSICAL EXAM:  General: Alert and oriented x 3. No apparent distress.  HEENT: Normocephalic, sclera are nonicteric. Hearing appropriate bilaterally.  Neck: No JVD or Carotid bruits. Trachea midline.   Cardiac: Regular rate and rhythm. S1, S2 auscultated. No murmurs, rubs, or gallops appreciated.   Lungs:a/p dullness. Chest expansion symmetrical. Regular effort.  Abdomen: Soft, non-tender, +BS. No hepatosplenomegaly or appreciable masses.   Extremities: Without clubbing, cyanosis or edema. Peripheral pulses are 2+.  Neurologic: Motor and sensory nerves grossly intact.   Psych: Appropriate affect   Skin: Warm and dry. No obvious lesions, wounds, or ulcerations.     MEDICATIONS:   hydrALAZINE  25 mg Oral Q8H EMERITA    furosemide  40 mg Intravenous BID (Diuretic)    insulin degludec  12 Units Subcutaneous Nightly    carvedilol  12.5 mg Oral BID with meals    amLODIPine  10 mg Oral Daily    insulin aspart  1-7 Units Subcutaneous TID CC    rosuvastatin  5 mg Oral Nightly    [Held by provider] apixaban  2.5 mg Oral BID      continuous dose heparin 1,800 Units/hr (11/12/24 2106)     ASSESSMENT:    Patient was admitted with progressive dyspnea, orthopnea, and chest pain.     Paroxysmal Afib/ New Atrial Flutter   - Rate controlled on BB  - [id number] 3  - IV Heparin     Acute HFpEF  - Bnp 758, CXR with right pleural effusion   - Nephrology managing diuretics, Net neg 5L and Wt is trending down    Elevated Troponin   - Flat trend, EKG with non-spec TW abnormality   - 7/2024 nuclear stress with normal perfusion   - ECHO pending, IV heparin     BRUNA on CKD IV  - Nephrology following   - Cr peak 4.32, plans for HD now     HTN Urgency   - Suboptimal on Amlodipine, Coreg, Hydralazine. Coreg is limited in titration due to low/normal heart rates     Chronic Anemia- Stable   HLD- LDL 87 on Crestor 5 mg     PLAN:  - Check ALT/AST if stable will  increase Crestor to 10 mg   - Check TSH and ECHO with new atrial flutter and hypertension urgency on admission   - Ischemic evaluation in patient verse outpatient dependent on ECHO results   - Daily standing weights   - Check proBNP  - Check Iron panel   - Add HF coordinator consult   - Resume heparin gtt when ok with IR, once no further planned procedures will switch to DOAC   - Add isosorbide TID to optimize blood pressure control     ADDENDUM 1333: TSH unremarkable, ECHO still pending. ALT mildly elevated hold off on increasing Crestor. proBNP 7,959. Iron sat 26, stable.     Plan of care discussed with patient and RN.     Marry Lord, APRN  11/13/2024  9:45 AM  [phone] ([place])  [phone] ([place])      Seen/examined patient independently.  Agree with findings, assessment and plan as outlined above.     Pt agreeable to HD, getting permcath placement today.   Denies any CP. Breathing improved.    Telemetry with 4:1 AV flutter.     In regards to volume overload, likely multifactorial: Acute on chronic CKD and HFpEF.  Appreciate volume optimization per nephrology. Plan for HD.      In regards to persistent a flutter, currently rate controlled, on coreg.  Currently on heparin drip but will transition to oral anticoagulant prior to discharge for elevated [id number].     In regards to elevated troponin, trend is not indicative of acute ischemia, possibly demand mediated versus non-MI related troponin elevation.  Patient had recent stress MPI testing in July 2024 that was normal. Given plan to initiate HD, can do definitive LHC following HD.      Sherif Zheng, DO

## 2024-11-13 NOTE — PLAN OF CARE
Problem: Patient Centered Care  Goal: Patient preferences are identified and integrated in the patient's plan of care  Description: Interventions:  - What would you like us to know as we care for you? From home alone  - Provide timely, complete, and accurate information to patient/family  - Incorporate patient and family knowledge, values, beliefs, and cultural backgrounds into the planning and delivery of care  - Encourage patient/family to participate in care and decision-making at the level they choose  - Honor patient and family perspectives and choices  Outcome: Progressing     Problem: Diabetes/Glucose Control  Goal: Glucose maintained within prescribed range  Description: INTERVENTIONS:  - Monitor Blood Glucose as ordered  - Assess for signs and symptoms of hyperglycemia and hypoglycemia  - Administer ordered medications to maintain glucose within target range  - Assess barriers to adequate nutritional intake and initiate nutrition consult as needed  - Instruct patient on self management of diabetes  Outcome: Progressing     Problem: Patient/Family Goals  Goal: Patient/Family Long Term Goal  Description: Patient's Long Term Goal: to return home    Interventions:  - take medications as prescribed  - follow MD orders  - monitor labs  - See additional Care Plan goals for specific interventions  Outcome: Progressing  Goal: Patient/Family Short Term Goal  Description: Patient's Short Term Goal: to breathe better    Interventions:   - diurese  - monitor labs  - strict I&O  - See additional Care Plan goals for specific interventions  Outcome: Progressing     Problem: CARDIOVASCULAR - ADULT  Goal: Maintains optimal cardiac output and hemodynamic stability  Description: INTERVENTIONS:  - Monitor vital signs, rhythm, and trends  - Monitor for bleeding, hypotension and signs of decreased cardiac output  - Evaluate effectiveness of vasoactive medications to optimize hemodynamic stability  - Monitor arterial and/or  venous puncture sites for bleeding and/or hematoma  - Assess quality of pulses, skin color and temperature  - Assess for signs of decreased coronary artery perfusion - ex. Angina  - Evaluate fluid balance, assess for edema, trend weights  Outcome: Progressing  Goal: Absence of cardiac arrhythmias or at baseline  Description: INTERVENTIONS:  - Continuous cardiac monitoring, monitor vital signs, obtain 12 lead EKG if indicated  - Evaluate effectiveness of antiarrhythmic and heart rate control medications as ordered  - Initiate emergency measures for life threatening arrhythmias  - Monitor electrolytes and administer replacement therapy as ordered  Outcome: Progressing

## 2024-11-13 NOTE — PROGRESS NOTES
[facility]  part of [facility]    Progress Note    Troy Gusatfson Sr. Patient Status:  Inpatient    3/11/1970 MRN [id number]   Location [facility] 3W/SW Attending Calvin Charlton MD   Hosp Day # 3 PCP None Pcp       Subjective:   Troy Gustafson Sr. is a(n) 54 year old male who I am seeing for ESRD  Tearful      Objective:   BP (!) 163/88 (BP Location: Left arm)   Pulse 63   Temp 98 °F (36.7 °C) (Oral)   Resp 20   Ht 6' 3\" (1.905 m)   Wt 279 lb 12.8 oz (126.9 kg)   SpO2 95%   BMI 34.97 kg/m²      Intake/Output Summary (Last 24 hours) at 2024 0949  Last data filed at 2024 0817  Gross per 24 hour   Intake 427.8 ml   Output 2375 ml   Net -1947.2 ml     Wt Readings from Last 1 Encounters:   24 279 lb 12.8 oz (126.9 kg)       Exam  Gen: No acute distress, Heent: NC AT, mucous memb clear, neck supple  Pulm: Lungs coarse, normal respiratory effort  CV: Heart with regular rate and rhythm, edema  Abd: Abdomen soft, nontender, nondistended, no organomegaly, bowel sounds present  Skin: no symptoms reported  Psych: alert and oriented    Assessment and Plan:       1 -ESRD  Long discussion with the patient, his aunt Crystal by telephone, and his daughters Becky at the bedside.    The patient is resistant to starting dialysis, but after long discussion he knows he will not last long without addressing his kidney failure.  Dialysis will be a life-sustaining modality.  He is agreeable to proceeding with renal replacement therapy.  I spoke with interventional radiology, they will place a dialysis catheter today and we will begin dialysis today itself     2 - CHFpEF/pulmonary hypertension/NSTEMI/A-fib  Patient may need a left heart cath Cardiology is following him.     3 -hypertension with CKD  He is on amlodipine     4 -diabetic nephropathy  Accu-Cheks     Discussed with the patient, his aunt Crystal, nurse Thu, and daughter Becky        Results:     Recent Labs   Lab  11/11/24 0433 11/12/24 0212 11/13/24  0615   RBC 4.34 4.23* 4.19*   HGB 11.7* 11.6* 11.6*   HCT 39.1 36.0* 35.6*   MCV 90.1 85.1 85.0   MCH 27.0 27.4 27.7   MCHC 29.9* 32.2 32.6   RDW 14.3 14.3 14.4   NEPRELIM 5.26 4.24 4.44   WBC 8.9 8.3 7.4   .0 252.0 261.0         Recent Labs   Lab 11/11/24 0433 11/12/24 0212 11/13/24 0615   * 139*  139* 119*  119*   BUN 46* 51*  51* 52*  52*   CREATSERUM 4.20* 4.32*  4.32* 4.17*  4.17*   CA 8.6* 8.6*  8.6* 8.7  8.7    143  143 141  141   K 4.5 4.2  4.2 4.4  4.4   * 113*  113* 111  111   CO2 20.0* 22.0  22.0 22.0  22.0          No results found.        SB LOCKHART MD  11/13/2024

## 2024-11-13 NOTE — PROGRESS NOTES
[facility]  part of [facility]  Progress Note    Troy Galedesire Pereira. Patient Status:  Inpatient    3/11/1970 MRN [id number]   Location [facility] 3W/SW Attending Calvin Charlton MD   Hosp Day # 3 PCP None Pcp       Subjective:   No complaints, nervous about starting dialysis.     Objective:   Comfortable, family at bedside.    Blood pressure 153/82, pulse 63, temperature 98 °F (36.7 °C), temperature source Oral, resp. rate 20, height 75\", weight 279 lb 12.8 oz (126.9 kg), SpO2 95%.    Results:   Labs:  Lab Results   Component Value Date    WBC 7.4 2024    RBC 4.19 2024    HGB 11.6 2024    HCT 35.6 2024    MCV 85.0 2024    MCH 27.7 2024    MCHC 32.6 2024    RDW 14.4 2024    .0 2024       Assessment and Plan:   Discussed dialysis catheter placement with Troy and his family members who were at bedside.  Questions were answered, he agrees to proceed.  Procedure explained in detail including benefits and risks.       JOANNE MATAMOROS, APRN  2024

## 2024-11-13 NOTE — PLAN OF CARE
Patient is alert and oriented x4 on room air able to move standby assist. Dialysis catheter placed today, plans for first dialysis treatment tonight. Patient and family updated on plan at bedside.    Problem: Patient Centered Care  Goal: Patient preferences are identified and integrated in the patient's plan of care  Description: Interventions:  - What would you like us to know as we care for you? I'm unsure about dialysis but I will so it for my kidneys.  - Provide timely, complete, and accurate information to patient/family  - Incorporate patient and family knowledge, values, beliefs, and cultural backgrounds into the planning and delivery of care  - Encourage patient/family to participate in care and decision-making at the level they choose  - Honor patient and family perspectives and choices  Outcome: Progressing     Problem: Diabetes/Glucose Control  Goal: Glucose maintained within prescribed range  Description: INTERVENTIONS:  - Monitor Blood Glucose as ordered  - Assess for signs and symptoms of hyperglycemia and hypoglycemia  - Administer ordered medications to maintain glucose within target range  - Assess barriers to adequate nutritional intake and initiate nutrition consult as needed  - Instruct patient on self management of diabetes  Outcome: Progressing     Problem: Patient/Family Goals  Goal: Patient/Family Long Term Goal  Description: Patient's Long Term Goal: to return home    Interventions:  - take medications as prescribed  - follow MD orders  - monitor labs  - See additional Care Plan goals for specific interventions  Outcome: Progressing  Goal: Patient/Family Short Term Goal  Description: Patient's Short Term Goal: to breathe better    Interventions:   - diurese  - monitor labs  - strict I&O  - See additional Care Plan goals for specific interventions  Outcome: Progressing     Problem: CARDIOVASCULAR - ADULT  Goal: Maintains optimal cardiac output and hemodynamic stability  Description:  INTERVENTIONS:  - Monitor vital signs, rhythm, and trends  - Monitor for bleeding, hypotension and signs of decreased cardiac output  - Evaluate effectiveness of vasoactive medications to optimize hemodynamic stability  - Monitor arterial and/or venous puncture sites for bleeding and/or hematoma  - Assess quality of pulses, skin color and temperature  - Assess for signs of decreased coronary artery perfusion - ex. Angina  - Evaluate fluid balance, assess for edema, trend weights  Outcome: Progressing  Goal: Absence of cardiac arrhythmias or at baseline  Description: INTERVENTIONS:  - Continuous cardiac monitoring, monitor vital signs, obtain 12 lead EKG if indicated  - Evaluate effectiveness of antiarrhythmic and heart rate control medications as ordered  - Initiate emergency measures for life threatening arrhythmias  - Monitor electrolytes and administer replacement therapy as ordered  Outcome: Progressing

## 2024-11-13 NOTE — SPIRITUAL CARE NOTE
Spiritual Care Visit Note    Patient Name: Troy Gustafson Sr. Date of Spiritual Care Visit: 24   : 3/11/1970 Primary Dx: Acute congestive heart failure, unspecified heart failure type (HCC)       Referred By: Referral From:     Spiritual Care Taxonomy:    Intended Effects: Lessen anxiety    Methods: Assist with spiritual/Religion practices;Offer emotional support    Interventions: Acknowledge current situation;Active listening;Ask guided questions about abdiaziz;Prayer for healing;[place];Provide hospitality;Explain  role;Provide compassionate touch    Visit Type/Summary:     - Spiritual Care: Offered empathic listening and emotional support. Patient and family expressed appreciation for  visit. Provided information regarding how to contact Spiritual Care and left a Spiritual Care information card.  stopped by room while rounding and prayed with patient and family before patient went to the cath lab.  remains available as needed for follow up.    Spiritual Care support can be requested via an Epic consult. For urgent/immediate needs, please contact the On Call  at: [place]: ext [phone]    Chaplain Resident, Patti Barahona, PhD

## 2024-11-14 NOTE — PROGRESS NOTES
Progress Note  Troy Gustafson Sr. Patient Status:  Inpatient    3/11/1970 MRN [id number]   Location [facility] 3W/SW Attending Calvin Charlton MD   Hosp Day # 4 PCP None Pcp     SUBJECTIVE:    Denies chest pain or dyspnea. Does not feel much different after dialysis yesterday.    VITALS:  BP (!) 173/86 (BP Location: Left arm)   Pulse 65   Temp 98.1 °F (36.7 °C) (Oral)   Resp 16   Ht 6' 3\" (1.905 m)   Wt 264 lb 4.8 oz (119.9 kg)   SpO2 96%   BMI 33.04 kg/m²     INTAKE/OUTPUT:    Intake/Output Summary (Last 24 hours) at 2024 0712  Last data filed at 2024 0629  Gross per 24 hour   Intake 408 ml   Output 3740 ml   Net -3332 ml     Last 3 Weights   24 0618 264 lb 4.8 oz (119.9 kg)   24 0514 279 lb 12.8 oz (126.9 kg)   24 0300 289 lb 4.8 oz (131.2 kg)   24 0600 292 lb 1.6 oz (132.5 kg)   11/10/24 1831 294 lb (133.4 kg)   11/10/24 1237 (!) 304 lb 7.3 oz (138.1 kg)   11/10/24 1207 265 lb (120.2 kg)   24 1128 275 lb (124.7 kg)   24 0500 266 lb 12.8 oz (121 kg)   24 0400 269 lb (122 kg)   24 0459 269 lb 6.4 oz (122.2 kg)   24 0243 266 lb (120.7 kg)   24 0028 269 lb 10 oz (122.3 kg)   24 0027 269 lb 6.4 oz (122.2 kg)     LABS:  Recent Labs   Lab 24  0212 24  0615 24  0554   *  139* 119*  119* 114*  114*   BUN 51*  51* 52*  52* 35*  35*   CREATSERUM 4.32*  4.32* 4.17*  4.17* 3.15*  3.15*   EGFRCR 15*  15* 16*  16* 22*  22*   CA 8.6*  8.6* 8.7  8.7 9.1  9.1     143 141  141 142  142   K 4.2  4.2 4.4  4.4 3.9  3.9   *  113* 111  111 109  109   CO2 22.0  22.0 22.0  22.0 27.0  27.0     Recent Labs   Lab 24  0212 24  0615 24  0554   RBC 4.23* 4.19* 4.62   HGB 11.6* 11.6* 13.0   HCT 36.0* 35.6* 39.6   MCV 85.1 85.0 85.7   MCH 27.4 27.7 28.1   MCHC 32.2 32.6 32.8   RDW 14.3 14.4 14.3   NEPRELIM 4.24 4.44 4.12   WBC 8.3 7.4 7.1   .0 261.0 230.0      No results for input(s): \"TROP\", \"CK\" in the last 168 hours.    DIAGNOSTICS:    TELEMETRY: Aflutter 60s    ROS: Negative unless noted above     PHYSICAL EXAM:  General: Alert and oriented x 3. No apparent distress.  HEENT: Normocephalic, sclera are nonicteric. Hearing appropriate bilaterally.  Neck: No JVD or Carotid bruits. Trachea midline.   Cardiac: Regular rate and rhythm. S1, S2 auscultated. No murmurs, rubs, or gallops appreciated.   Lungs:a/p dullness. Chest expansion symmetrical. Regular effort.  Abdomen: Soft, non-tender, +BS. No hepatosplenomegaly or appreciable masses.   Extremities: Without clubbing, cyanosis or edema. Peripheral pulses are 2+.  Neurologic: Motor and sensory nerves grossly intact.   Psych: Appropriate affect   Skin: Warm and dry. Rt chest port     MEDICATIONS:   heparin  1.5 mL Intracatheter Once    isosorbide dinitrate  20 mg Oral TID (Nitrates)    hydrALAZINE  25 mg Oral Q8H EMERITA    furosemide  40 mg Intravenous BID (Diuretic)    insulin degludec  12 Units Subcutaneous Nightly    carvedilol  12.5 mg Oral BID with meals    amLODIPine  10 mg Oral Daily    insulin aspart  1-7 Units Subcutaneous TID CC    rosuvastatin  5 mg Oral Nightly    [Held by provider] apixaban  2.5 mg Oral BID      continuous dose heparin 1,800 Units/hr (11/14/24 0044)     ASSESSMENT:    Patient was admitted with progressive dyspnea, orthopnea, and chest pain.     Paroxysmal Afib/ New Atrial Flutter   - Rate controlled on BB  - Esbhv1upsg 3, TSH unremarkable   - IV Heparin     Acute HFpEF  - proBNP 7,959, CXR with right pleural effusion   - Nephrology managing diuretics, Now on HD post placed 11/13/24, s/p 1.3 L removed yesterday Net neg 8L and Wt is trending down though exact number is inaccurate     Elevated Troponin   - Flat trend, EKG with non-spec TW abnormality   - 7/2024 nuclear stress with normal perfusion   - ECHO pending read, IV heparin     BRUNA on CKD IV  - Nephrology following   - Cr peak 4.32, plans  for HD now     HTN Urgency   - Suboptimal on Amlodipine, Coreg, Hydralazine, Isosorbide. Coreg is limited in titration due to low/normal heart rates     Chronic Anemia- Stable, Iron sat 26%  HLD- LDL 87 on Crestor 5 mg, ALT mildly elevated holding on increasing Cresot     PLAN:  - Missed antihypertensive for two doses yesterday, new to isosorbide, will evaluate BP after medications given today if still elevated will increase Hydralazine to 50 mg TID   - Ischemic evaluation in patient verse outpatient dependent on ECHO results   - Daily standing weights   - HF coordinator consult to see patient   - Once no further planned procedures will switch to DOAC     Plan of care discussed with patient and RN.     Marry Funes Risa, APRN  11/14/2024  7:13 AM  [phone] ([place])  [phone] ([place])      I saw and examined patient agree with attached findings.  Now has initiated dialysis, currently receiving today.  Eventually transition off of IV heparin to DOAC.  TTE confirms preserved EF, mild left atrial enlargement, otherwise unremarkable.  No plans for further ischemic evaluation as an inpatient, can consider coronary CTA as outpatient if necessary.  Further adjustments to Antepar tensive medications as above.    Ross Hanley MD  [place] [facility] [place]
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
## 2024-11-14 NOTE — PROGRESS NOTES
[facility]  part of [facility]    Progress Note    Troy Gustafson Sr. Patient Status:  Inpatient    3/11/1970 MRN [id number]   Location [facility] 3W/SW Attending Calvin Charlton MD   Hosp Day # 4 PCP None Pcp       Subjective:   Troy Gustafson Sr. is a(n) 54 year old male who I am seeing for ESRD    The patient is seen on dialysis      Objective:   BP (!) 170/82 (BP Location: Left arm)   Pulse 64   Temp 98.2 °F (36.8 °C) (Oral)   Resp 18   Ht 6' 3\" (1.905 m)   Wt 264 lb 4.8 oz (119.9 kg)   SpO2 94%   BMI 33.04 kg/m²      Intake/Output Summary (Last 24 hours) at 2024 1001  Last data filed at 2024 0822  Gross per 24 hour   Intake 658 ml   Output 3230 ml   Net -2572 ml     Wt Readings from Last 1 Encounters:   24 264 lb 4.8 oz (119.9 kg)       Exam  Gen: No acute distress, Heent: NC AT, mucous memb clear, neck supple  Pulm: Lungs clear, normal respiratory effort  CV: Heart with regular rate and rhythm, edema  Abd: Abdomen soft, nontender, nondistended, no organomegaly, bowel sounds present  Skin: no symptoms reported  Psych: alert and oriented  Right IJ accessed  Assessment and Plan:       1 -ESRD  Patient has started chronic hemodialysis.  He will need to continue with this as an outpatient and select an outpatient dialysis unit.    He understands that he needs this as a life-sustaining modality.  Plan next dialysis treatment on Saturday if he is here.  Otherwise he can be discharged as if he has an outpatient dialysis unit    2 - CHFpEF/pulmonary hypertension/NSTEMI/A-fib  Patient may need a left heart cath Cardiology is following him.  Await echo results     3 -hypertension with CKD  He is on amlodipine and hydralazine     4 -diabetic nephropathy  Accu-Cheks      Results:     Recent Labs   Lab 24  0212 24  0615 24  0554   RBC 4.23* 4.19* 4.62   HGB 11.6* 11.6* 13.0   HCT 36.0* 35.6* 39.6   MCV 85.1 85.0 85.7   MCH 27.4 27.7 28.1   MCHC  32.2 32.6 32.8   RDW 14.3 14.4 14.3   NEPRELIM 4.24 4.44 4.12   WBC 8.3 7.4 7.1   .0 261.0 230.0         Recent Labs   Lab 11/12/24  0212 11/13/24  0615 11/14/24  0554   *  139* 119*  119* 114*  114*   BUN 51*  51* 52*  52* 35*  35*   CREATSERUM 4.32*  4.32* 4.17*  4.17* 3.15*  3.15*   CA 8.6*  8.6* 8.7  8.7 9.1  9.1     143 141  141 142  142   K 4.2  4.2 4.4  4.4 3.9  3.9   *  113* 111  111 109  109   CO2 22.0  22.0 22.0  22.0 27.0  27.0          No results found.        SB LOCKHART MD  11/14/2024

## 2024-11-14 NOTE — PROGRESS NOTES
[facility]  part of [facility]  Progress Note    Troy Galedesire Pereira. Patient Status:  Inpatient    3/11/1970 MRN [id number]   Location [facility] 3W/SW Attending Calvin Charlton MD   Hosp Day # 4 PCP None Pcp       Subjective:   No complaints.    Objective:   Right chest catheter site intact, clean and dry.     Blood pressure (!) 170/82, pulse 64, temperature 98.2 °F (36.8 °C), temperature source Oral, resp. rate 18, height 75\", weight 264 lb 4.8 oz (119.9 kg), SpO2 94%.        Results:   Labs:  Lab Results   Component Value Date    WBC 7.1 2024    RBC 4.62 2024    HGB 13.0 2024    HCT 39.6 2024    MCV 85.7 2024    MCH 28.1 2024    MCHC 32.8 2024    RDW 14.3 2024    .0 2024       Assessment and Plan:   S/P tunneled dialysis catheter placement.  Getting dialyzed now.  Site intact.       JOANNE MATAMOROS, APRN  2024

## 2024-11-14 NOTE — CM/SW NOTE
LEY[facility] HD [facility] via [facility] as asked by LEYDA Gonzalez.    Will need to upload HD flowsheets to the referral when they are available.      NIK Maxwell, LSW [id number]

## 2024-11-15 NOTE — CONSULTS
Heart Failure Nurse  Progress Note    Patient was evaluated by the Heart Failure Nurse  for understanding, verbalization, demonstration, and recall of education related to heart failure, overall adherence to the behaviors necessary to maintain a compensated status, and risk for readmission.     Patient assessment:Upon entry patient's entire family present. Family was surprised of my presence asking many questions about his status. Patient and family will need time to hear from both staff  and physician about his clinical situation. Encouraged he find a primary care MD to help with his outpt needs as he will need close follow up and frequent labs during the initial process. Patient and family have a significant knowledge deficit with this hospitalization and all the events. Will go back to bedside once angiogram is completed. Educated on topics below. Will follow during this hospitalization and extension given to family for any needs at this time.    Patient is able to verbalize signs/symptoms fluid overload/impending HF exacerbation and who to contact with problems                                          __x_ yes  ___ no      Patient is following a 2000 mg sodium diet                                             __x_ yes  ___ no    If no, barriers to 2000 mg sodium diet:_______________________________________________    Patient informed of 2-Part dietician classes that is free if sign up within 30 days of discharge or $40  ___ yes  ___ no      Patient is adherent to medication regimen                                              _x__ yes  ___ no    If no, barriers to medication regimen:    Patient has sufficient funds to purchase medication                      _x__ yes  ___ no      Patient has a scale in the home              __x_ yes  ___ no      Patient is adherent to daily weight monitoring                                        _x__ yes   ___ no    If no, barriers to daily weight  monitoring:    Symptom Tracker Worksheet reviewed with patient  _x__ yes   ___ no      Patient verbalizes understanding of stoplight/heart failure zones          __x_ yes   ___ no      Patient understands the importance of 7-day follow-up appointment      _x__ yes  ___ no    Appointment Date: 11/21/24 3pm with Shelly Olivas           Patient has adequate transportation to attend follow-up appointments    __x_ yes  ___ no    If no, was referral to Social Work made  ___yes  ___ no      Family/Friend present during education: significant amount of family at bedside.    Additional consultations required: social work for PCP     Randall Santos RN HF  [phone]

## 2024-11-15 NOTE — PLAN OF CARE
Notified by ICU APANJALI Carrera that pt is in afib rvr with HR in 110-120's. Discussed with Dr Zheng, plan to give amiodarone 150mg bolus, then follow amiodarone gtt per amio protocol

## 2024-11-15 NOTE — CM/SW NOTE
Social work was able to meet with the patient and speak to his daughter via phone regarding HD location.     The patient and his family are agreeable to the [facility] location.     The [facility] Admission for this case is [place] [phone] Ext [phone].    All documents have been turned in.    Clinical and Financial Clearance is needed for schedule is given.    Social work will follow up.    SW/CM to remain available for support and/or discharge planning.     Viviane Lewis MSW, LSW  Discharge Planner [id number]

## 2024-11-15 NOTE — PROGRESS NOTES
Progress Note  Troy Gustafson Sr. Patient Status:  Inpatient    3/11/1970 MRN [id number]   Location [facility] 3W/SW Attending Calvin Charlton MD   Hosp Day # 5 PCP None Pcp     Subjective:  Pt resting comfortably on the side of bed. Denies any chest pain or SOB.     Objective:  /72 (BP Location: Left arm)   Pulse 64   Temp 98.1 °F (36.7 °C) (Oral)   Resp 18   Ht 6' 3\" (1.905 m)   Wt 264 lb 4.8 oz (119.9 kg)   SpO2 96%   BMI 33.04 kg/m²     Telemetry: aflutter       Intake/Output:    Intake/Output Summary (Last 24 hours) at 11/15/2024 1057  Last data filed at 11/15/2024 0903  Gross per 24 hour   Intake 680 ml   Output 3700 ml   Net -3020 ml       Last 3 Weights   24 0618 264 lb 4.8 oz (119.9 kg)   24 0514 279 lb 12.8 oz (126.9 kg)   24 0300 289 lb 4.8 oz (131.2 kg)   24 0600 292 lb 1.6 oz (132.5 kg)   11/10/24 1831 294 lb (133.4 kg)   11/10/24 1237 (!) 304 lb 7.3 oz (138.1 kg)   11/10/24 1207 265 lb (120.2 kg)   24 1128 275 lb (124.7 kg)   24 0500 266 lb 12.8 oz (121 kg)   24 0400 269 lb (122 kg)   24 0459 269 lb 6.4 oz (122.2 kg)   24 0243 266 lb (120.7 kg)   24 0028 269 lb 10 oz (122.3 kg)   24 0027 269 lb 6.4 oz (122.2 kg)       Labs:  Recent Labs   Lab 24  0615 24  0554 11/15/24  0626   *  119* 114*  114* 119*  119*   BUN 52*  52* 35*  35* 35*  35*   CREATSERUM 4.17*  4.17* 3.15*  3.15* 3.44*  3.44*   EGFRCR 16*  16* 22*  22* 20*  20*   CA 8.7  8.7 9.1  9.1 8.9  8.9     141 142  142 141  141   K 4.4  4.4 3.9  3.9 4.0  4.0     111 109  109 108  108   CO2 22.0  22.0 27.0  27.0 26.0  26.0     Recent Labs   Lab 24  0615 24  0554 11/15/24  0626   RBC 4.19* 4.62 4.25*   HGB 11.6* 13.0 11.5*   HCT 35.6* 39.6 35.7*   MCV 85.0 85.7 84.0   MCH 27.7 28.1 27.1   MCHC 32.6 32.8 32.2   RDW 14.4 14.3 14.5   NEPRELIM 4.44 4.12 4.79   WBC 7.4 7.1 8.2   .0 230.0  195.0         Recent Labs   Lab 11/10/24  1229 11/10/24  1656 24  0433   TROPHS 329* 288* 275*     No results found for: \"PT\", \"INR\"    Diagnostics:     Echo 24    Conclusions:     1. Left ventricle: The cavity size was normal. Wall thickness was mildly      increased. Systolic function was normal. The estimated ejection fraction      was 60-65%, by visual assessment. Wall motion is normal; there are no      regional wall motion abnormalities. Unable to assess LV diastolic      function due to heart rhythm.   2. Mitral valve: There was mild regurgitation.   3. Left atrium: The atrium was mildly dilated.   Impressions:  No significant change since prior study.   *     Review of Systems   Respiratory: Negative.     Cardiovascular: Negative.        Physical Exam:    Physical Exam  Vitals reviewed.   Constitutional:       General: He is not in acute distress.     Appearance: He is obese. He is not ill-appearing.   Neck:      Vascular: No JVD.   Cardiovascular:      Rate and Rhythm: Normal rate and regular rhythm.      Pulses: Normal pulses.      Heart sounds: Normal heart sounds. No murmur heard.     No friction rub. No gallop.   Pulmonary:      Effort: Pulmonary effort is normal.      Breath sounds: Normal breath sounds. No stridor. No wheezing, rhonchi or rales.   Chest:      Chest wall: No tenderness.   Abdominal:      Palpations: Abdomen is soft.   Musculoskeletal:      Cervical back: Normal range of motion.      Right lower le+ Edema present.      Left lower le+ Edema present.   Neurological:      Mental Status: He is alert and oriented to person, place, and time.         Medications:   furosemide  40 mg Oral Daily    heparin  1.5 mL Intracatheter Once    isosorbide dinitrate  20 mg Oral TID (Nitrates)    hydrALAZINE  25 mg Oral Q8H [facility]    insulin degludec  12 Units Subcutaneous Nightly    carvedilol  12.5 mg Oral BID with meals    amLODIPine  10 mg Oral Daily    insulin aspart  1-7 Units  Subcutaneous TID CC    rosuvastatin  5 mg Oral Nightly    [Held by provider] apixaban  2.5 mg Oral BID      continuous dose heparin 2,000 Units/hr (11/15/24 9732)       Assessment/Plan:    Paroxysmal atrial fibrillation/ atrial flutter  - continue apixaban per renal dosing     Acute HFpEF   - ProBNP 7, 959, CXR with right pleural effusion   - nephrology following and started on oral diuretics starting today   - on HD with, HD with 3L out yesterday. Net negative of 11.2L     Elevated troponin   - trop flat   - EKG with non spec TW abnormality  - 7/2024 nuclear stress with noraml perfusion  - echo with LVEF 60-65%, no RWMA, mild MR  - on IV heparin     BRUNA on CKD IV  - nephrology following   - on HD    HTN urgency   - on amlodipine, coreg, hydralazine, isosobide.  - coreg uptiration limited with HR in 60's    Chronic anemia  - stable    HLD  - LDL 87  - on crestor 5mg     Plan;  - will plan for CCTA as OP  - stop heparin and switch to DOAC  - continue on Crestor, coreg, isosorbide, hydralazine and amlodipine   - pt to follow up with [facility] office in 1 week,     Cardiology will follow peripherally, call with any questions or concerns    ALBERTO Mathews  [facility] [place]  11/15/2024  10:57 AM

## 2024-11-15 NOTE — PROGRESS NOTES
[facility]  part of Located within [facility]    Progress Note    Troy Gustafson Sr. Patient Status:  Inpatient    3/11/1970 MRN [id number]   Location [facility] 3W/SW Attending Calvin Charlton MD   Hosp Day # 5 PCP None Pcp       Subjective:   Troy Gustafson Sr. is a(n) 54 year old male who I am seeing for ESRD      Resting in his chair today.  Daughter Becky is at his bedside    Objective:   /72 (BP Location: Left arm)   Pulse 64   Temp 98.1 °F (36.7 °C) (Oral)   Resp 18   Ht 6' 3\" (1.905 m)   Wt 264 lb 4.8 oz (119.9 kg)   SpO2 96%   BMI 33.04 kg/m²      Intake/Output Summary (Last 24 hours) at 11/15/2024 1030  Last data filed at 11/15/2024 0903  Gross per 24 hour   Intake 680 ml   Output 3700 ml   Net -3020 ml     Wt Readings from Last 1 Encounters:   24 264 lb 4.8 oz (119.9 kg)       Exam  Gen: No acute distress, Heent: NC AT, mucous memb clear, neck supple  Pulm: Lungs clear, normal respiratory effort  CV: Heart with regular rate and rhythm, no edema  Abd: Abdomen soft, nontender, nondistended, no organomegaly, bowel sounds present  Skin: no symptoms reported  Psych: alert and oriented    Assessment and Plan:       1 -ESRD  Patient has started chronic hemodialysis.  He will need to continue with this as an outpatient and select an outpatient dialysis unit.     He understands that he needs this as a life-sustaining modality.  Plan next dialysis treatment on Saturday.  The patient has no insurance.  This will take coordination with [facility].    2 - CHFpEF/pulmonary hypertension/NSTEMI/A-fib  Echo reviewed.  The patient will not need an ischemic workup while here.  Change IV Lasix to p.o. Lasix.     3 -hypertension with CKD  He is on amlodipine and hydralazine     4 -diabetic nephropathy  Accu-Cheks          Results:     Recent Labs   Lab 24  0615 24  0554 11/15/24  0626   RBC 4.19* 4.62 4.25*   HGB 11.6* 13.0 11.5*   HCT 35.6* 39.6 35.7*   MCV 85.0 85.7  84.0   MCH 27.7 28.1 27.1   MCHC 32.6 32.8 32.2   RDW 14.4 14.3 14.5   NEPRELIM 4.44 4.12 4.79   WBC 7.4 7.1 8.2   .0 230.0 195.0         Recent Labs   Lab 11/13/24  0615 11/14/24  0554 11/15/24  0626   *  119* 114*  114* 119*  119*   BUN 52*  52* 35*  35* 35*  35*   CREATSERUM 4.17*  4.17* 3.15*  3.15* 3.44*  3.44*   CA 8.7  8.7 9.1  9.1 8.9  8.9     141 142  142 141  141   K 4.4  4.4 3.9  3.9 4.0  4.0     111 109  109 108  108   CO2 22.0  22.0 27.0  27.0 26.0  26.0          IR TUNNELED CV CATH INSERTION EXCHANGE CHECK    Result Date: 11/14/2024  CONCLUSION: Placement of tunneled dialysis venous catheter via right internal jugular vein.    Dictated by (CST): Ramón Fam MD on 11/14/2024 at 3:21 PM     Finalized by (CST): Ramón Fam MD on 11/14/2024 at 3:22 PM               SB LOCKHART MD  11/15/2024

## 2024-11-15 NOTE — SPIRITUAL CARE NOTE
Spiritual Care Visit Note    Patient Name: Troy Gustafson Sr. Date of Spiritual Care Visit: 11/15/24   : 3/11/1970 Primary Dx: Acute congestive heart failure, unspecified heart failure type (HCC)       Referred By: Referral From:     Spiritual Care Taxonomy:    Intended Effects: Build relationship of care and support    Methods: Exploring hope    Interventions: Acknowledge current situation;Prayer for healing;Explain  role;Provide hospitality    Visit Type/Summary:     - Spiritual Care: Consulted with RN prior to visit. Offered empathic listening and emotional support. Patient and family expressed appreciation for  visit. Provided information regarding how to contact Spiritual Care and left a Spiritual Care information card.  prayed for patient.  remains available as needed for follow up.    Spiritual Care support can be requested via an Epic consult. For urgent/immediate needs, please contact the On Call  at: [place]: ext [phone]    Chaplain Resident, Patti Barahona PhD

## 2024-11-15 NOTE — PLAN OF CARE
Problem: Patient Centered Care  Goal: Patient preferences are identified and integrated in the patient's plan of care  Description: Interventions:  - What would you like us to know as we care for you? From home  - Provide timely, complete, and accurate information to patient/family  - Incorporate patient and family knowledge, values, beliefs, and cultural backgrounds into the planning and delivery of care  - Encourage patient/family to participate in care and decision-making at the level they choose  - Honor patient and family perspectives and choices  Outcome: Progressing     Problem: Diabetes/Glucose Control  Goal: Glucose maintained within prescribed range  Description: INTERVENTIONS:  - Monitor Blood Glucose as ordered  - Assess for signs and symptoms of hyperglycemia and hypoglycemia  - Administer ordered medications to maintain glucose within target range  - Assess barriers to adequate nutritional intake and initiate nutrition consult as needed  - Instruct patient on self management of diabetes  Outcome: Progressing     Problem: Patient/Family Goals  Goal: Patient/Family Long Term Goal  Description: Patient's Long Term Goal: to return home    Interventions:  - take medications as prescribed  - follow MD orders  - monitor labs  - See additional Care Plan goals for specific interventions  Outcome: Progressing  Goal: Patient/Family Short Term Goal  Description: Patient's Short Term Goal: to breathe better    Interventions:   - diurese  - monitor labs  - strict I&O  - See additional Care Plan goals for specific interventions  Outcome: Progressing     Problem: CARDIOVASCULAR - ADULT  Goal: Maintains optimal cardiac output and hemodynamic stability  Description: INTERVENTIONS:  - Monitor vital signs, rhythm, and trends  - Monitor for bleeding, hypotension and signs of decreased cardiac output  - Evaluate effectiveness of vasoactive medications to optimize hemodynamic stability  - Monitor arterial and/or venous  puncture sites for bleeding and/or hematoma  - Assess quality of pulses, skin color and temperature  - Assess for signs of decreased coronary artery perfusion - ex. Angina  - Evaluate fluid balance, assess for edema, trend weights  Outcome: Progressing  Goal: Absence of cardiac arrhythmias or at baseline  Description: INTERVENTIONS:  - Continuous cardiac monitoring, monitor vital signs, obtain 12 lead EKG if indicated  - Evaluate effectiveness of antiarrhythmic and heart rate control medications as ordered  - Initiate emergency measures for life threatening arrhythmias  - Monitor electrolytes and administer replacement therapy as ordered  Outcome: Progressing

## 2024-11-16 NOTE — PLAN OF CARE
Pt A&O4. On room air. No acute complaints overnight. Called [facility]. Plan: HD 11/16.     Problem: Patient Centered Care  Goal: Patient preferences are identified and integrated in the patient's plan of care  Description: Interventions:  - What would you like us to know as we care for you? From home  - Provide timely, complete, and accurate information to patient/family  - Incorporate patient and family knowledge, values, beliefs, and cultural backgrounds into the planning and delivery of care  - Encourage patient/family to participate in care and decision-making at the level they choose  - Honor patient and family perspectives and choices  11/16/2024 0358 by Lexy Curry RN  Outcome: Progressing  11/16/2024 0358 by Lexy Curry RN  Outcome: Progressing     Problem: Diabetes/Glucose Control  Goal: Glucose maintained within prescribed range  Description: INTERVENTIONS:  - Monitor Blood Glucose as ordered  - Assess for signs and symptoms of hyperglycemia and hypoglycemia  - Administer ordered medications to maintain glucose within target range  - Assess barriers to adequate nutritional intake and initiate nutrition consult as needed  - Instruct patient on self management of diabetes  11/16/2024 0358 by Lexy Curry RN  Outcome: Progressing  11/16/2024 0358 by Lexy Curry RN  Outcome: Progressing     Problem: Patient/Family Goals  Goal: Patient/Family Long Term Goal  Description: Patient's Long Term Goal: to return home    Interventions:  - take medications as prescribed  - follow MD orders  - monitor labs  - See additional Care Plan goals for specific interventions  11/16/2024 0358 by Lexy Curry RN  Outcome: Progressing  11/16/2024 0358 by Lexy Curry RN  Outcome: Progressing  Goal: Patient/Family Short Term Goal  Description: Patient's Short Term Goal: to breathe better    Interventions:   - diurese  - monitor labs  - strict I&O  - See additional Care  Plan goals for specific interventions  11/16/2024 0358 by Lexy Curry RN  Outcome: Progressing  11/16/2024 0358 by Lexy Curry RN  Outcome: Progressing     Problem: CARDIOVASCULAR - ADULT  Goal: Maintains optimal cardiac output and hemodynamic stability  Description: INTERVENTIONS:  - Monitor vital signs, rhythm, and trends  - Monitor for bleeding, hypotension and signs of decreased cardiac output  - Evaluate effectiveness of vasoactive medications to optimize hemodynamic stability  - Monitor arterial and/or venous puncture sites for bleeding and/or hematoma  - Assess quality of pulses, skin color and temperature  - Assess for signs of decreased coronary artery perfusion - ex. Angina  - Evaluate fluid balance, assess for edema, trend weights  11/16/2024 0358 by Lexy Curry RN  Outcome: Progressing  11/16/2024 0358 by Lexy Curry RN  Outcome: Progressing  Goal: Absence of cardiac arrhythmias or at baseline  Description: INTERVENTIONS:  - Continuous cardiac monitoring, monitor vital signs, obtain 12 lead EKG if indicated  - Evaluate effectiveness of antiarrhythmic and heart rate control medications as ordered  - Initiate emergency measures for life threatening arrhythmias  - Monitor electrolytes and administer replacement therapy as ordered  11/16/2024 0358 by Lexy Curry RN  Outcome: Progressing  11/16/2024 0358 by Lexy Curry RN  Outcome: Progressing
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
## 2024-11-16 NOTE — CM/SW NOTE
CM called [facility] Ad[facility] and left VM with Anaya requesting call back to confirm patient approval/HD scheduling.  [facility] is not available on weekends.    CM called [facility] [place] clinic and spoke with Abdias - confirming patient has been approved for HD with start date Wednesday, 11/20 Chair time: 5:55 AM (patient to arrive 15 minutes prior). CM req start date of Monday 11/18 in the event patient discharges this weekend.  Per Abdias, she is unable to make this change as mgmt is not available on the weekend to approve this start date.    / to remain available for support and/or discharge planning.     Plan: Home with outpatient HD w/ [facility] - once medically cleared    Ruth Metcalf RN, BSN  Nurse   [phone]

## 2024-11-16 NOTE — PROGRESS NOTES
[facility]  part of [facility]    Progress Note    Troy Gustafson Sr. Patient Status:  Inpatient    3/11/1970 MRN [id number]   Location [facility] 3W/SW Attending Calvin Charlton MD   Hosp Day # 6 PCP None Pcp       Subjective:   Troy Gustafson Sr. is a(n) 54 year old male who I am seeing for ESRD      Seen on dialysis tolerating well      Objective:   BP (!) 172/84 (BP Location: Left arm)   Pulse 63   Temp 97.4 °F (36.3 °C) (Temporal)   Resp 16   Ht 6' 3\" (1.905 m)   Wt 270 lb (122.5 kg)   SpO2 96%   BMI 33.75 kg/m²      Intake/Output Summary (Last 24 hours) at 2024 1236  Last data filed at 2024 1049  Gross per 24 hour   Intake 960 ml   Output 4675 ml   Net -3715 ml     Wt Readings from Last 1 Encounters:   24 270 lb (122.5 kg)       Exam  Vital signs: Blood pressure (!) 172/84, pulse 63, temperature 97.4 °F (36.3 °C), temperature source Temporal, resp. rate 16, height 6' 3\" (1.905 m), weight 270 lb (122.5 kg), SpO2 96%.    General: No acute distress. Alert and oriented x 3.  HEENT: Moist mucous membranes. EOMI. PERRLA  Neck:  No JVD.   Respiratory: Clear to auscultation bilaterally.    Cardiovascular: S1, S2.  Regular rate and rhythm.   Abdomen: Soft, nontender, nondistended.    Neurologic: No focal neurological deficits.  Musculoskeletal: Full range of motion of all extremities.  edema  Access: TDC    Results:     Recent Labs   Lab 24  0554 11/15/24  0626 24  0708   RBC 4.62 4.25* 4.08*   HGB 13.0 11.5* 11.0*   HCT 39.6 35.7* 34.3*   MCV 85.7 84.0 84.1   MCH 28.1 27.1 27.0   MCHC 32.8 32.2 32.1   RDW 14.3 14.5 14.5   NEPRELIM 4.12 4.79 4.69   WBC 7.1 8.2 7.2   .0 195.0 195.0         Recent Labs   Lab 24  0554 11/15/24  0626 24  0708   *  114* 119*  119* 126*  126*   BUN 35*  35* 35*  35* 33*  33*   CREATSERUM 3.15*  3.15* 3.44*  3.44* 2.98*  2.98*   CA 9.1  9.1 8.9  8.9 9.0  9.0     142 141  141  139  139   K 3.9  3.9 4.0  4.0 3.9  3.9     109 108  108 106  106   CO2 27.0  27.0 26.0  26.0 28.0  28.0          No results found.    Assessment and Plan:     Impression:    1 -ESRD  Patient has started chronic hemodialysis.  He will need to continue with this as an outpatient and select an outpatient dialysis unit.  Getting HD TTS schedule ( sees Dr Vance as outpatient)       The patient has no insurance.  [facility]     2 - CHFpEF/pulmonary hypertension/NSTEMI/A-fib  Echo reviewed.     p.o. Lasix.     3 -hypertension with CKD  He is on amlodipine and hydralazine     4 -diabetic nephropathy  Accu-Cheks        Thank you very much for allowing me to participate in the care of your patient.  If you have any questions, please do not hesitate to contact me.     Yari Lowe MD  11/16/2024

## 2024-11-16 NOTE — DIABETES ED
[facility], [place]    Diabetes Education  Note    Troy Gustafson Sr. Patient Status:  Inpatient   3/11/1970 MRN [id number]  Location [facility] 3W/SW Attending Calvin Charlton MD  Hosp Day # 6 PCP None Pcp      Labs:    HgbA1C (%)   Date Value   2024 9.8 (H)         Reason for Visit: Met with patient and family members for diabetes education. Patient awake alert ,receptive to education. Daughter reports that patient was diagnosed with type 2 diabetes 2 yrs ago. Patient lives at home with family. Patient has started hemodialysis at present admission and will need to continue upon discharge. Patient is currently on 70/30 insulin 12 units bid. Reports monitoring blood glucose levels bid. Patient is able to identify some carbohydrates foods but admits to needing more education on diabetes healthy eating. Basic meal guidelines discussed and inpatient handouts provided. Patient has never had diabetes education, will benefit from out patient education follow up. Patient and daughter voiced understanding.          Education Provided:    Benefits of testing BG as directed - record results and report to MD  Basic Diet Guidelines  Beginning carb counting - initial meal plan provided  Importance of good BG control to prevent short and long term complications  Importance of close follow up with PCP and medical team      Patient verbalized understanding and was receptive to information provided.      Recommendations:  Attend outpatient diabetes education          Deonna Madrid RN  Diabetes Educator  2024  3:51 PM

## 2024-11-16 NOTE — PLAN OF CARE
Patient is from home alone. A&Ox4. Room air. Patient denies pain at this time. Self in room. Dialysis completed in am - 3L removed. No complications. Bed in lowest position and locked. Call light in hand. Personal belongings w/in reach.     Problem: Patient Centered Care  Goal: Patient preferences are identified and integrated in the patient's plan of care  Description: Interventions:  - What would you like us to know as we care for you? From home  - Provide timely, complete, and accurate information to patient/family  - Incorporate patient and family knowledge, values, beliefs, and cultural backgrounds into the planning and delivery of care  - Encourage patient/family to participate in care and decision-making at the level they choose  - Honor patient and family perspectives and choices  Outcome: Progressing     Problem: Diabetes/Glucose Control  Goal: Glucose maintained within prescribed range  Description: INTERVENTIONS:  - Monitor Blood Glucose as ordered  - Assess for signs and symptoms of hyperglycemia and hypoglycemia  - Administer ordered medications to maintain glucose within target range  - Assess barriers to adequate nutritional intake and initiate nutrition consult as needed  - Instruct patient on self management of diabetes  Outcome: Progressing     Problem: Patient/Family Goals  Goal: Patient/Family Long Term Goal  Description: Patient's Long Term Goal: to return home    Interventions:  - take medications as prescribed  - follow MD orders  - monitor labs  - See additional Care Plan goals for specific interventions  Outcome: Progressing  Goal: Patient/Family Short Term Goal  Description: Patient's Short Term Goal: to breathe better    Interventions:   - diurese  - monitor labs  - strict I&O  - See additional Care Plan goals for specific interventions  Outcome: Progressing     Problem: CARDIOVASCULAR - ADULT  Goal: Maintains optimal cardiac output and hemodynamic stability  Description: INTERVENTIONS:  -  Monitor vital signs, rhythm, and trends  - Monitor for bleeding, hypotension and signs of decreased cardiac output  - Evaluate effectiveness of vasoactive medications to optimize hemodynamic stability  - Monitor arterial and/or venous puncture sites for bleeding and/or hematoma  - Assess quality of pulses, skin color and temperature  - Assess for signs of decreased coronary artery perfusion - ex. Angina  - Evaluate fluid balance, assess for edema, trend weights  Outcome: Progressing  Goal: Absence of cardiac arrhythmias or at baseline  Description: INTERVENTIONS:  - Continuous cardiac monitoring, monitor vital signs, obtain 12 lead EKG if indicated  - Evaluate effectiveness of antiarrhythmic and heart rate control medications as ordered  - Initiate emergency measures for life threatening arrhythmias  - Monitor electrolytes and administer replacement therapy as ordered  Outcome: Progressing    Problem: METABOLIC/FLUID AND ELECTROLYTES - ADULT  Goal: Glucose maintained within prescribed range  Description: INTERVENTIONS:  - Monitor Blood Glucose as ordered  - Assess for signs and symptoms of hyperglycemia and hypoglycemia  - Administer ordered medications to maintain glucose within target range  - Assess barriers to adequate nutritional intake and initiate nutrition consult as needed  - Instruct patient on self management of diabetes  11/16/2024 1106 by Louise Payton, RN  Outcome: Progressing

## 2024-11-17 NOTE — PLAN OF CARE
No acute events throughout the night.   Problem: Patient Centered Care  Goal: Patient preferences are identified and integrated in the patient's plan of care  Description: Interventions:  - What would you like us to know as we care for you? From home  - Provide timely, complete, and accurate information to patient/family  - Incorporate patient and family knowledge, values, beliefs, and cultural backgrounds into the planning and delivery of care  - Encourage patient/family to participate in care and decision-making at the level they choose  - Honor patient and family perspectives and choices  Outcome: Progressing     Problem: Diabetes/Glucose Control  Goal: Glucose maintained within prescribed range  Description: INTERVENTIONS:  - Monitor Blood Glucose as ordered  - Assess for signs and symptoms of hyperglycemia and hypoglycemia  - Administer ordered medications to maintain glucose within target range  - Assess barriers to adequate nutritional intake and initiate nutrition consult as needed  - Instruct patient on self management of diabetes  Outcome: Progressing     Problem: Patient/Family Goals  Goal: Patient/Family Long Term Goal  Description: Patient's Long Term Goal: to return home    Interventions:  - take medications as prescribed  - follow MD orders  - monitor labs  - See additional Care Plan goals for specific interventions  Outcome: Progressing  Goal: Patient/Family Short Term Goal  Description: Patient's Short Term Goal: to breathe better    Interventions:   - diurese  - monitor labs  - strict I&O  - See additional Care Plan goals for specific interventions  Outcome: Progressing     Problem: CARDIOVASCULAR - ADULT  Goal: Maintains optimal cardiac output and hemodynamic stability  Description: INTERVENTIONS:  - Monitor vital signs, rhythm, and trends  - Monitor for bleeding, hypotension and signs of decreased cardiac output  - Evaluate effectiveness of vasoactive medications to optimize hemodynamic  stability  - Monitor arterial and/or venous puncture sites for bleeding and/or hematoma  - Assess quality of pulses, skin color and temperature  - Assess for signs of decreased coronary artery perfusion - ex. Angina  - Evaluate fluid balance, assess for edema, trend weights  Outcome: Progressing  Goal: Absence of cardiac arrhythmias or at baseline  Description: INTERVENTIONS:  - Continuous cardiac monitoring, monitor vital signs, obtain 12 lead EKG if indicated  - Evaluate effectiveness of antiarrhythmic and heart rate control medications as ordered  - Initiate emergency measures for life threatening arrhythmias  - Monitor electrolytes and administer replacement therapy as ordered  Outcome: Progressing     Problem: METABOLIC/FLUID AND ELECTROLYTES - ADULT  Goal: Glucose maintained within prescribed range  Description: INTERVENTIONS:  - Monitor Blood Glucose as ordered  - Assess for signs and symptoms of hyperglycemia and hypoglycemia  - Administer ordered medications to maintain glucose within target range  - Assess barriers to adequate nutritional intake and initiate nutrition consult as needed  - Instruct patient on self management of diabetes  Outcome: Progressing  Goal: Electrolytes maintained within normal limits  Description: INTERVENTIONS:  - Monitor labs and rhythm and assess patient for signs and symptoms of electrolyte imbalances  - Administer electrolyte replacement as ordered  - Monitor response to electrolyte replacements, including rhythm and repeat lab results as appropriate  - Fluid restriction as ordered  - Instruct patient on fluid and nutrition restrictions as appropriate  Outcome: Progressing  Goal: Hemodynamic stability and optimal renal function maintained  Description: INTERVENTIONS:  - Monitor labs and assess for signs and symptoms of volume excess or deficit  - Monitor intake, output and patient weight  - Monitor urine specific gravity, serum osmolarity and serum sodium as indicated or  ordered  - Monitor response to interventions for patient's volume status, including labs, urine output, blood pressure (other measures as available)  - Encourage oral intake as appropriate  - Instruct patient on fluid and nutrition restrictions as appropriate  Outcome: Progressing

## 2024-11-17 NOTE — PROGRESS NOTES
[facility]  part of [facility]    Progress Note    Troy Gustafson Sr. Patient Status:  Inpatient    3/11/1970 MRN [id number]   Location [facility] 3W/SW Attending Calvin Charlton MD   Hosp Day # 7 PCP None Pcp       Subjective:   Troy Gustafson Sr. is a(n) 54 year old male who I am seeing for ESRD      No new issues no new issues      Objective:   /75 (BP Location: Left arm)   Pulse 62   Temp 98 °F (36.7 °C) (Oral)   Resp 16   Ht 6' 3\" (1.905 m)   Wt 265 lb (120.2 kg)   SpO2 96%   BMI 33.12 kg/m²      Intake/Output Summary (Last 24 hours) at 2024 1154  Last data filed at 2024 0725  Gross per 24 hour   Intake 495 ml   Output 800 ml   Net -305 ml     Wt Readings from Last 1 Encounters:   24 265 lb (120.2 kg)       Exam  Vital signs: Blood pressure 157/75, pulse 62, temperature 98 °F (36.7 °C), temperature source Oral, resp. rate 16, height 6' 3\" (1.905 m), weight 265 lb (120.2 kg), SpO2 96%.    General: No acute distress. Alert and oriented x 3.  HEENT: Moist mucous membranes. EOMI. PERRLA  Neck:  No JVD.   Respiratory: Clear to auscultation bilaterally.    Cardiovascular: S1, S2.  Regular rate and rhythm.   Abdomen: Soft, nontender, nondistended.    Neurologic: No focal neurological deficits.  Musculoskeletal: Full range of motion of all extremities.  edema  Access: TDC    Results:     Recent Labs   Lab 11/15/24  0626 24  0708 24  0629   RBC 4.25* 4.08* 4.11*   HGB 11.5* 11.0* 11.2*   HCT 35.7* 34.3* 34.8*   MCV 84.0 84.1 84.7   MCH 27.1 27.0 27.3   MCHC 32.2 32.1 32.2   RDW 14.5 14.5 14.5   NEPRELIM 4.79 4.69 5.02   WBC 8.2 7.2 8.3   .0 195.0 185.0         Recent Labs   Lab 11/15/24  0626 24  0708 24  0629   *  119* 126*  126* 120*   BUN 35*  35* 33*  33* 33*   CREATSERUM 3.44*  3.44* 2.98*  2.98* 3.52*   CA 8.9  8.9 9.0  9.0 8.8     141 139  139 139   K 4.0  4.0 3.9  3.9 4.3     108 106   106 106   CO2 26.0  26.0 28.0  28.0 27.0          No results found.    Assessment and Plan:     Impression:    1 -ESRD  Patient has started chronic hemodialysis.  He will need to continue with this as an outpatient and select an outpatient dialysis unit.  Getting HD TTS schedule ( sees Dr Vance as outpatient)       The patient has no insurance.  [facility]     2 - CHFpEF/pulmonary hypertension/NSTEMI/A-fib  Echo reviewed.     p.o. Lasix.     3 -hypertension with CKD  He is on amlodipine and hydralazine, imdur and coreg     4 -diabetic nephropathy  Accu-Cheks        Thank you very much for allowing me to participate in the care of your patient.  If you have any questions, please do not hesitate to contact me.     Yari Lowe MD
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
## 2024-11-17 NOTE — PLAN OF CARE
Patient is A/Ox4. On RA. Tolerating diet. ACHS accucheck. Voiding freely. Independent. No complaints of pain or nausea. Call light and personal items within reach. Plan for discharge once dialysis chair available.       Problem: Diabetes/Glucose Control  Goal: Glucose maintained within prescribed range  Description: INTERVENTIONS:  - Monitor Blood Glucose as ordered  - Assess for signs and symptoms of hyperglycemia and hypoglycemia  - Administer ordered medications to maintain glucose within target range  - Assess barriers to adequate nutritional intake and initiate nutrition consult as needed  - Instruct patient on self management of diabetes  Outcome: Progressing     Problem: CARDIOVASCULAR - ADULT  Goal: Maintains optimal cardiac output and hemodynamic stability  Description: INTERVENTIONS:  - Monitor vital signs, rhythm, and trends  - Monitor for bleeding, hypotension and signs of decreased cardiac output  - Evaluate effectiveness of vasoactive medications to optimize hemodynamic stability  - Monitor arterial and/or venous puncture sites for bleeding and/or hematoma  - Assess quality of pulses, skin color and temperature  - Assess for signs of decreased coronary artery perfusion - ex. Angina  - Evaluate fluid balance, assess for edema, trend weights  Outcome: Progressing  Goal: Absence of cardiac arrhythmias or at baseline  Description: INTERVENTIONS:  - Continuous cardiac monitoring, monitor vital signs, obtain 12 lead EKG if indicated  - Evaluate effectiveness of antiarrhythmic and heart rate control medications as ordered  - Initiate emergency measures for life threatening arrhythmias  - Monitor electrolytes and administer replacement therapy as ordered  Outcome: Progressing     Problem: METABOLIC/FLUID AND ELECTROLYTES - ADULT  Goal: Glucose maintained within prescribed range  Description: INTERVENTIONS:  - Monitor Blood Glucose as ordered  - Assess for signs and symptoms of hyperglycemia and hypoglycemia  -  Administer ordered medications to maintain glucose within target range  - Assess barriers to adequate nutritional intake and initiate nutrition consult as needed  - Instruct patient on self management of diabetes  Outcome: Progressing  Goal: Electrolytes maintained within normal limits  Description: INTERVENTIONS:  - Monitor labs and rhythm and assess patient for signs and symptoms of electrolyte imbalances  - Administer electrolyte replacement as ordered  - Monitor response to electrolyte replacements, including rhythm and repeat lab results as appropriate  - Fluid restriction as ordered  - Instruct patient on fluid and nutrition restrictions as appropriate  Outcome: Progressing  Goal: Hemodynamic stability and optimal renal function maintained  Description: INTERVENTIONS:  - Monitor labs and assess for signs and symptoms of volume excess or deficit  - Monitor intake, output and patient weight  - Monitor urine specific gravity, serum osmolarity and serum sodium as indicated or ordered  - Monitor response to interventions for patient's volume status, including labs, urine output, blood pressure (other measures as available)  - Encourage oral intake as appropriate  - Instruct patient on fluid and nutrition restrictions as appropriate  Outcome: Progressing

## 2024-11-18 NOTE — PLAN OF CARE
Tele-the called to report pt had 18 beats of V-tach,  -150. Pt was asymptomatic, denies any chest pain or discomfort. Message sent to Dr Kate Herrera via Perfect serve will cont to monitor cardiac status.    2255 Mg ordered, results pending    2340 Mg 2.1

## 2024-11-18 NOTE — DIETARY NOTE
BRIEF DIETITIAN NOTE     Pt re-screened for LOS (length of stay). Found to be at no nutrition risk at this time. Good PO intakes. Weight coming down during admission d/t diuresis and HD, on lasix. No other significant weight changes. Please consult RD if further nutrition intervention is needed.     Percent Meals Eaten (last 6 days)       Date/Time Percent Meals Eaten (%)    11/12/24 0900 100 %    11/12/24 1500 100 %    11/13/24 1033 100 %    11/14/24 0822 100 %    11/14/24 1355 100 %    11/14/24 1900 90 %    11/15/24 0903 90 %    11/15/24 1800 100 %    11/16/24 1046 100 %    11/16/24 1414 100 %    11/17/24 0900 100 %    11/17/24 1302 80 %             Patient Weight(s) for the past 336 hrs:   Weight   11/18/24 0500 117.4 kg (258 lb 14.4 oz)   11/17/24 0910 120.2 kg (265 lb)   11/16/24 0545 122.5 kg (270 lb)   11/14/24 0618 119.9 kg (264 lb 4.8 oz)   11/13/24 0514 126.9 kg (279 lb 12.8 oz)   11/12/24 0300 131.2 kg (289 lb 4.8 oz)   11/11/24 0600 132.5 kg (292 lb 1.6 oz)   11/10/24 1831 133.4 kg (294 lb)   11/10/24 1237 (!) 138.1 kg (304 lb 7.3 oz)   11/10/24 1207 120.2 kg (265 lb)        Wt Readings from Last 6 Encounters:   11/18/24 117.4 kg (258 lb 14.4 oz)   07/30/24 124.7 kg (275 lb)   07/25/24 121 kg (266 lb 12.8 oz)        F/u per protocol or as appropriate.       Isela Anderson RD, LDN  Clinical Dietitian  P: [phone]

## 2024-11-18 NOTE — PLAN OF CARE
Problem: Patient Centered Care  Goal: Patient preferences are identified and integrated in the patient's plan of care  Description: Interventions:  - What would you like us to know as we care for you? From home  - Provide timely, complete, and accurate information to patient/family  - Incorporate patient and family knowledge, values, beliefs, and cultural backgrounds into the planning and delivery of care  - Encourage patient/family to participate in care and decision-making at the level they choose  - Honor patient and family perspectives and choices  Outcome: Progressing     Problem: Diabetes/Glucose Control  Goal: Glucose maintained within prescribed range  Description: INTERVENTIONS:  - Monitor Blood Glucose as ordered  - Assess for signs and symptoms of hyperglycemia and hypoglycemia  - Administer ordered medications to maintain glucose within target range  - Assess barriers to adequate nutritional intake and initiate nutrition consult as needed  - Instruct patient on self management of diabetes  Outcome: Progressing     Problem: Patient/Family Goals  Goal: Patient/Family Long Term Goal  Description: Patient's Long Term Goal: to return home    Interventions:  - take medications as prescribed  - follow MD orders  - monitor labs  - See additional Care Plan goals for specific interventions  Outcome: Progressing  Goal: Patient/Family Short Term Goal  Description: Patient's Short Term Goal: to breathe better    Interventions:   - diurese  - monitor labs  - strict I&O  - See additional Care Plan goals for specific interventions  Outcome: Progressing     Problem: CARDIOVASCULAR - ADULT  Goal: Maintains optimal cardiac output and hemodynamic stability  Description: INTERVENTIONS:  - Monitor vital signs, rhythm, and trends  - Monitor for bleeding, hypotension and signs of decreased cardiac output  - Evaluate effectiveness of vasoactive medications to optimize hemodynamic stability  - Monitor arterial and/or venous  puncture sites for bleeding and/or hematoma  - Assess quality of pulses, skin color and temperature  - Assess for signs of decreased coronary artery perfusion - ex. Angina  - Evaluate fluid balance, assess for edema, trend weights  Outcome: Progressing  Goal: Absence of cardiac arrhythmias or at baseline  Description: INTERVENTIONS:  - Continuous cardiac monitoring, monitor vital signs, obtain 12 lead EKG if indicated  - Evaluate effectiveness of antiarrhythmic and heart rate control medications as ordered  - Initiate emergency measures for life threatening arrhythmias  - Monitor electrolytes and administer replacement therapy as ordered  Outcome: Progressing     Problem: METABOLIC/FLUID AND ELECTROLYTES - ADULT  Goal: Glucose maintained within prescribed range  Description: INTERVENTIONS:  - Monitor Blood Glucose as ordered  - Assess for signs and symptoms of hyperglycemia and hypoglycemia  - Administer ordered medications to maintain glucose within target range  - Assess barriers to adequate nutritional intake and initiate nutrition consult as needed  - Instruct patient on self management of diabetes  Outcome: Progressing  Goal: Electrolytes maintained within normal limits  Description: INTERVENTIONS:  - Monitor labs and rhythm and assess patient for signs and symptoms of electrolyte imbalances  - Administer electrolyte replacement as ordered  - Monitor response to electrolyte replacements, including rhythm and repeat lab results as appropriate  - Fluid restriction as ordered  - Instruct patient on fluid and nutrition restrictions as appropriate  Outcome: Progressing  Goal: Hemodynamic stability and optimal renal function maintained  Description: INTERVENTIONS:  - Monitor labs and assess for signs and symptoms of volume excess or deficit  - Monitor intake, output and patient weight  - Monitor urine specific gravity, serum osmolarity and serum sodium as indicated or ordered  - Monitor response to interventions for  patient's volume status, including labs, urine output, blood pressure (other measures as available)  - Encourage oral intake as appropriate  - Instruct patient on fluid and nutrition restrictions as appropriate  Outcome: Progressing     Problem: PAIN - ADULT  Goal: Verbalizes/displays adequate comfort level or patient's stated pain goal  Description: INTERVENTIONS:  - Encourage pt to monitor pain and request assistance  - Assess pain using appropriate pain scale  - Administer analgesics based on type and severity of pain and evaluate response  - Implement non-pharmacological measures as appropriate and evaluate response  - Consider cultural and social influences on pain and pain management  - Manage/alleviate anxiety  - Utilize distraction and/or relaxation techniques  - Monitor for opioid side effects  - Notify MD/LIP if interventions unsuccessful or patient reports new pain  - Anticipate increased pain with activity and pre-medicate as appropriate  Outcome: Progressing     Problem: SAFETY ADULT - FALL  Goal: Free from fall injury  Description: INTERVENTIONS:  - Assess pt frequently for physical needs  - Identify cognitive and physical deficits and behaviors that affect risk of falls.  - [place] fall precautions as indicated by assessment.  - Educate pt/family on patient safety including physical limitations  - Instruct pt to call for assistance with activity based on assessment  - Modify environment to reduce risk of injury  - Provide assistive devices as appropriate  - Consider OT/PT consult to assist with strengthening/mobility  - Encourage toileting schedule  Outcome: Progressing     Problem: DISCHARGE PLANNING  Goal: Discharge to home or other facility with appropriate resources  Description: INTERVENTIONS:  - Identify barriers to discharge w/pt and caregiver  - Include patient/family/discharge partner in discharge planning  - Arrange for needed discharge resources and transportation as appropriate  -  Identify discharge learning needs (meds, wound care, etc)  - Arrange for interpreters to assist at discharge as needed  - Consider post-discharge preferences of patient/family/discharge partner  - Complete POLST form as appropriate  - Assess patient's ability to be responsible for managing their own health  - Refer to Case Management Department for coordinating discharge planning if the patient needs post-hospital services based on physician/LIP order or complex needs related to functional status, cognitive ability or social support system  Outcome: Progressing

## 2024-11-18 NOTE — DISCHARGE PLANNING
Patient to be discharged after dialysis this evening. 3 of his medications were filled by the [facility]. 30 day free trial eliquis coupon also provided to patient.     [facility] pharmacy on file called to check if they have eliquis is in stock. Per Tj, eliquis 2.5 mg is in stock and can be filled today.     Printed prescriptions for 2 of the patient's medications provided with discharge instructions.     Verito ARRINGTON, Discharge Leader [id number]
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
## 2024-11-18 NOTE — PLAN OF CARE
Patient is A/Ox4. On RA. Tolerating diet. ACHS accucheck. Voiding freely. Independent. No complaints of pain or nausea. Call light and personal items within reach. HD done today. Plan for discharge after night eliquis dose.     Problem: Diabetes/Glucose Control  Goal: Glucose maintained within prescribed range  Description: INTERVENTIONS:  - Monitor Blood Glucose as ordered  - Assess for signs and symptoms of hyperglycemia and hypoglycemia  - Administer ordered medications to maintain glucose within target range  - Assess barriers to adequate nutritional intake and initiate nutrition consult as needed  - Instruct patient on self management of diabetes  Outcome: Progressing     Problem: CARDIOVASCULAR - ADULT  Goal: Maintains optimal cardiac output and hemodynamic stability  Description: INTERVENTIONS:  - Monitor vital signs, rhythm, and trends  - Monitor for bleeding, hypotension and signs of decreased cardiac output  - Evaluate effectiveness of vasoactive medications to optimize hemodynamic stability  - Monitor arterial and/or venous puncture sites for bleeding and/or hematoma  - Assess quality of pulses, skin color and temperature  - Assess for signs of decreased coronary artery perfusion - ex. Angina  - Evaluate fluid balance, assess for edema, trend weights  Outcome: Progressing  Goal: Absence of cardiac arrhythmias or at baseline  Description: INTERVENTIONS:  - Continuous cardiac monitoring, monitor vital signs, obtain 12 lead EKG if indicated  - Evaluate effectiveness of antiarrhythmic and heart rate control medications as ordered  - Initiate emergency measures for life threatening arrhythmias  - Monitor electrolytes and administer replacement therapy as ordered  Outcome: Progressing     Problem: METABOLIC/FLUID AND ELECTROLYTES - ADULT  Goal: Glucose maintained within prescribed range  Description: INTERVENTIONS:  - Monitor Blood Glucose as ordered  - Assess for signs and symptoms of hyperglycemia and  hypoglycemia  - Administer ordered medications to maintain glucose within target range  - Assess barriers to adequate nutritional intake and initiate nutrition consult as needed  - Instruct patient on self management of diabetes  Outcome: Progressing  Goal: Electrolytes maintained within normal limits  Description: INTERVENTIONS:  - Monitor labs and rhythm and assess patient for signs and symptoms of electrolyte imbalances  - Administer electrolyte replacement as ordered  - Monitor response to electrolyte replacements, including rhythm and repeat lab results as appropriate  - Fluid restriction as ordered  - Instruct patient on fluid and nutrition restrictions as appropriate  Outcome: Progressing  Goal: Hemodynamic stability and optimal renal function maintained  Description: INTERVENTIONS:  - Monitor labs and assess for signs and symptoms of volume excess or deficit  - Monitor intake, output and patient weight  - Monitor urine specific gravity, serum osmolarity and serum sodium as indicated or ordered  - Monitor response to interventions for patient's volume status, including labs, urine output, blood pressure (other measures as available)  - Encourage oral intake as appropriate  - Instruct patient on fluid and nutrition restrictions as appropriate  Outcome: Progressing     Problem: PAIN - ADULT  Goal: Verbalizes/displays adequate comfort level or patient's stated pain goal  Description: INTERVENTIONS:  - Encourage pt to monitor pain and request assistance  - Assess pain using appropriate pain scale  - Administer analgesics based on type and severity of pain and evaluate response  - Implement non-pharmacological measures as appropriate and evaluate response  - Consider cultural and social influences on pain and pain management  - Manage/alleviate anxiety  - Utilize distraction and/or relaxation techniques  - Monitor for opioid side effects  - Notify MD/LIP if interventions unsuccessful or patient reports new pain  -  Anticipate increased pain with activity and pre-medicate as appropriate  Outcome: Progressing     Problem: SAFETY ADULT - FALL  Goal: Free from fall injury  Description: INTERVENTIONS:  - Assess pt frequently for physical needs  - Identify cognitive and physical deficits and behaviors that affect risk of falls.  - [place] fall precautions as indicated by assessment.  - Educate pt/family on patient safety including physical limitations  - Instruct pt to call for assistance with activity based on assessment  - Modify environment to reduce risk of injury  - Provide assistive devices as appropriate  - Consider OT/PT consult to assist with strengthening/mobility  - Encourage toileting schedule  Outcome: Progressing     Problem: DISCHARGE PLANNING  Goal: Discharge to home or other facility with appropriate resources  Description: INTERVENTIONS:  - Identify barriers to discharge w/pt and caregiver  - Include patient/family/discharge partner in discharge planning  - Arrange for needed discharge resources and transportation as appropriate  - Identify discharge learning needs (meds, wound care, etc)  - Arrange for interpreters to assist at discharge as needed  - Consider post-discharge preferences of patient/family/discharge partner  - Complete POLST form as appropriate  - Assess patient's ability to be responsible for managing their own health  - Refer to Case Management Department for coordinating discharge planning if the patient needs post-hospital services based on physician/LIP order or complex needs related to functional status, cognitive ability or social support system  Outcome: Progressing

## 2024-11-18 NOTE — PROGRESS NOTES
To whom it may concern,         Sj العراقي Troy JASON [birth year] was admitted to [facility] from 11/10/24 - 24.  He can resume all work related responsibilities without any restrictions.         Thank You,  Calvin Charlton MD  [facility] Hospitalist

## 2024-11-18 NOTE — CM/SW NOTE
Social work received an update from Hiral at [facility] Admissions that the patient is clinically and financially cleared for HD.    Your first treatment is: Wednesday, November 20, 2024 at 05:55 AM  Your tentative dialysis schedule is: MONDAY, WEDNESDAY, FRIDAY at 05:55 AM    [facility]   [phone]  [address] [address]    Schedule left at patient's bedside and information placed in patient instructions.    SW/CM to remain available for support and/or discharge planning.     Viviane Lewis MSW, LSW  Discharge Planner [id number]
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
## 2024-11-18 NOTE — PROGRESS NOTES
[facility]  part of [facility]    Progress Note    Troy Gustafson Sr. Patient Status:  Inpatient    3/11/1970 MRN [id number]   Location [facility] 3W/SW Attending Calvin Charlton MD   Hosp Day # 8 PCP None Pcp       Subjective:   Troy Gustafson Sr. is a(n) 54 year old male who I am seeing for ESRD    Resting      Objective:   /82 (BP Location: Left arm)   Pulse 62   Temp 97.9 °F (36.6 °C) (Oral)   Resp 18   Ht 6' 3\" (1.905 m)   Wt 258 lb 14.4 oz (117.4 kg)   SpO2 95%   BMI 32.36 kg/m²      Intake/Output Summary (Last 24 hours) at 2024 1043  Last data filed at 2024 0600  Gross per 24 hour   Intake 460 ml   Output 850 ml   Net -390 ml     Wt Readings from Last 1 Encounters:   24 258 lb 14.4 oz (117.4 kg)       Exam  Gen: No acute distress, Heent: NC AT, mucous memb clear, neck supple  Pulm: Lungs clear, normal respiratory effort  CV: Heart with regular rate and rhythm, no edema  Abd: Abdomen soft, nontender, nondistended, no organomegaly, bowel sounds present  Skin: no symptoms reported  Psych: alert and oriented    Assessment and Plan:       1 -ESRD  Patient has started hemodialysis.  He will need to continue with this as an outpatient and select an outpatient dialysis unit.     He understands that he needs this as a life-sustaining modality.  Plan next dialysis treatmenttoday.  The patient has no insurance.  This will take coordination with [facility].  [facility]     2 - CHFpEF/pulmonary hypertension/NSTEMI/A-fib  P.o. Lasix     3 -hypertension with CKD  He is on amlodipine and hydralazine     4 -diabetic nephropathy  Accu-Cheks             Results:     Recent Labs   Lab 24  0708 24  0629 24  0628   RBC 4.08* 4.11* 3.99*   HGB 11.0* 11.2* 11.1*   HCT 34.3* 34.8* 33.9*   MCV 84.1 84.7 85.0   MCH 27.0 27.3 27.8   MCHC 32.1 32.2 32.7   RDW 14.5 14.5 14.2   NEPRELIM 4.69 5.02 4.18   WBC 7.2 8.3 7.3   .0  185.0 185.0         Recent Labs   Lab 11/16/24  0708 11/17/24  0629 11/18/24  0628   *  126* 120* 121*   BUN 33*  33* 33* 47*   CREATSERUM 2.98*  2.98* 3.52* 4.05*   CA 9.0  9.0 8.8 9.2     139 139 139   K 3.9  3.9 4.3 4.2     106 106 107   CO2 28.0  28.0 27.0 23.0          No results found.        SB LOCKHART MD  11/18/2024

## 2024-11-19 NOTE — PROGRESS NOTES
[facility]  part of [facility]    Progress Note    Troy Gustafson Sr. Patient Status:  Inpatient    3/11/1970 MRN [id number]   Location [facility] 3W/SW Attending Atilio Kenny MD   Hosp Day # 9 PCP None Pcp       Subjective:   Troy Gustafson Sr. is a(n) 54 year old male who I am seeing for ESRD      Patient states he did not have a ride home yesterday.  He will be going this morning    Objective:   /89 (BP Location: Left arm)   Pulse 62   Temp 98 °F (36.7 °C) (Oral)   Resp 16   Ht 6' 3\" (1.905 m)   Wt 256 lb 14.4 oz (116.5 kg)   SpO2 95%   BMI 32.11 kg/m²      Intake/Output Summary (Last 24 hours) at 2024 0941  Last data filed at 2024 0600  Gross per 24 hour   Intake 760 ml   Output 3300 ml   Net -2540 ml     Wt Readings from Last 1 Encounters:   24 256 lb 14.4 oz (116.5 kg)       Exam  Gen: No acute distress, Heent: NC AT, mucous memb clear, neck supple  Pulm: Lungs clear, normal respiratory effort  CV: Heart with regular rate and rhythm, no edema  Abd: Abdomen soft, nontender, nondistended, no organomegaly, bowel sounds present  Skin: no symptoms reported  Psych: alert and oriented    Assessment and Plan:       1 -ESRD  Patient has started hemodialysis.  He will need to continue with this as an outpatient and select an outpatient dialysis unit.     He understands that he needs this as a life-sustaining modality.  Plan next dialysis treatment as an outpatient tomorrow morning.  He is to arrive at [facility] to the dialysis unit      2 - CHFpEF/pulmonary hypertension/NSTEMI/A-fib  P.o. Lasix     3 -hypertension with CKD  He is on amlodipine and hydralazine     4 -diabetic nephropathy  Accu-Cheks               Results:     Recent Labs   Lab 24  0629 24  0628 24  0702   RBC 4.11* 3.99* 4.29*   HGB 11.2* 11.1* 11.4*   HCT 34.8* 33.9* 35.9*   MCV 84.7 85.0 83.7   MCH 27.3 27.8 26.6   MCHC 32.2 32.7 31.8   RDW 14.5 14.2 14.3   NEPRELIM 5.02 4.18  3.94   WBC 8.3 7.3 6.6   .0 185.0 201.0         Recent Labs   Lab 11/17/24  0629 11/18/24  0628 11/19/24  0702   * 121* 116*  116*   BUN 33* 47* 32*  32*   CREATSERUM 3.52* 4.05* 3.43*  3.43*   CA 8.8 9.2 9.1  9.1    139 140  140   K 4.3 4.2 4.0  4.0    107 105  105   CO2 27.0 23.0 26.0  26.0          No results found.        SB LOCKHART MD  11/19/2024

## 2024-11-19 NOTE — PLAN OF CARE
Problem: Patient Centered Care  Goal: Patient preferences are identified and integrated in the patient's plan of care  Description: Interventions:  - What would you like us to know as we care for you? From home  - Provide timely, complete, and accurate information to patient/family  - Incorporate patient and family knowledge, values, beliefs, and cultural backgrounds into the planning and delivery of care  - Encourage patient/family to participate in care and decision-making at the level they choose  - Honor patient and family perspectives and choices  Outcome: Progressing     Problem: Diabetes/Glucose Control  Goal: Glucose maintained within prescribed range  Description: INTERVENTIONS:  - Monitor Blood Glucose as ordered  - Assess for signs and symptoms of hyperglycemia and hypoglycemia  - Administer ordered medications to maintain glucose within target range  - Assess barriers to adequate nutritional intake and initiate nutrition consult as needed  - Instruct patient on self management of diabetes  Outcome: Progressing     Problem: Patient/Family Goals  Goal: Patient/Family Long Term Goal  Description: Patient's Long Term Goal: to return home    Interventions:  - take medications as prescribed  - follow MD orders  - monitor labs  - See additional Care Plan goals for specific interventions  Outcome: Progressing  Goal: Patient/Family Short Term Goal  Description: Patient's Short Term Goal: to breathe better    Interventions:   - diurese  - monitor labs  - strict I&O  - See additional Care Plan goals for specific interventions  Outcome: Progressing     Problem: CARDIOVASCULAR - ADULT  Goal: Maintains optimal cardiac output and hemodynamic stability  Description: INTERVENTIONS:  - Monitor vital signs, rhythm, and trends  - Monitor for bleeding, hypotension and signs of decreased cardiac output  - Evaluate effectiveness of vasoactive medications to optimize hemodynamic stability  - Monitor arterial and/or venous  puncture sites for bleeding and/or hematoma  - Assess quality of pulses, skin color and temperature  - Assess for signs of decreased coronary artery perfusion - ex. Angina  - Evaluate fluid balance, assess for edema, trend weights  Outcome: Progressing  Goal: Absence of cardiac arrhythmias or at baseline  Description: INTERVENTIONS:  - Continuous cardiac monitoring, monitor vital signs, obtain 12 lead EKG if indicated  - Evaluate effectiveness of antiarrhythmic and heart rate control medications as ordered  - Initiate emergency measures for life threatening arrhythmias  - Monitor electrolytes and administer replacement therapy as ordered  Outcome: Progressing     Problem: METABOLIC/FLUID AND ELECTROLYTES - ADULT  Goal: Glucose maintained within prescribed range  Description: INTERVENTIONS:  - Monitor Blood Glucose as ordered  - Assess for signs and symptoms of hyperglycemia and hypoglycemia  - Administer ordered medications to maintain glucose within target range  - Assess barriers to adequate nutritional intake and initiate nutrition consult as needed  - Instruct patient on self management of diabetes  Outcome: Progressing  Goal: Electrolytes maintained within normal limits  Description: INTERVENTIONS:  - Monitor labs and rhythm and assess patient for signs and symptoms of electrolyte imbalances  - Administer electrolyte replacement as ordered  - Monitor response to electrolyte replacements, including rhythm and repeat lab results as appropriate  - Fluid restriction as ordered  - Instruct patient on fluid and nutrition restrictions as appropriate  Outcome: Progressing  Goal: Hemodynamic stability and optimal renal function maintained  Description: INTERVENTIONS:  - Monitor labs and assess for signs and symptoms of volume excess or deficit  - Monitor intake, output and patient weight  - Monitor urine specific gravity, serum osmolarity and serum sodium as indicated or ordered  - Monitor response to interventions for  patient's volume status, including labs, urine output, blood pressure (other measures as available)  - Encourage oral intake as appropriate  - Instruct patient on fluid and nutrition restrictions as appropriate  Outcome: Progressing     Problem: PAIN - ADULT  Goal: Verbalizes/displays adequate comfort level or patient's stated pain goal  Description: INTERVENTIONS:  - Encourage pt to monitor pain and request assistance  - Assess pain using appropriate pain scale  - Administer analgesics based on type and severity of pain and evaluate response  - Implement non-pharmacological measures as appropriate and evaluate response  - Consider cultural and social influences on pain and pain management  - Manage/alleviate anxiety  - Utilize distraction and/or relaxation techniques  - Monitor for opioid side effects  - Notify MD/LIP if interventions unsuccessful or patient reports new pain  - Anticipate increased pain with activity and pre-medicate as appropriate  Outcome: Progressing     Problem: SAFETY ADULT - FALL  Goal: Free from fall injury  Description: INTERVENTIONS:  - Assess pt frequently for physical needs  - Identify cognitive and physical deficits and behaviors that affect risk of falls.  - [place] fall precautions as indicated by assessment.  - Educate pt/family on patient safety including physical limitations  - Instruct pt to call for assistance with activity based on assessment  - Modify environment to reduce risk of injury  - Provide assistive devices as appropriate  - Consider OT/PT consult to assist with strengthening/mobility  - Encourage toileting schedule  Outcome: Progressing     Problem: DISCHARGE PLANNING  Goal: Discharge to home or other facility with appropriate resources  Description: INTERVENTIONS:  - Identify barriers to discharge w/pt and caregiver  - Include patient/family/discharge partner in discharge planning  - Arrange for needed discharge resources and transportation as appropriate  -  Identify discharge learning needs (meds, wound care, etc)  - Arrange for interpreters to assist at discharge as needed  - Consider post-discharge preferences of patient/family/discharge partner  - Complete POLST form as appropriate  - Assess patient's ability to be responsible for managing their own health  - Refer to Case Management Department for coordinating discharge planning if the patient needs post-hospital services based on physician/LIP order or complex needs related to functional status, cognitive ability or social support system  Outcome: Progressing

## 2024-11-19 NOTE — TELEPHONE ENCOUNTER
Patient is on dialysis and will be followed by nephrology at dialysis. No appt needed in our clinic.

## 2024-11-19 NOTE — PROGRESS NOTES
Patient was provided with discharge instructions, education, and follow up information. Patient's Daughter present for discharge instructions with patient's consent. Printed prescriptions were given to patient/Prescriptions were already sent electronically to patient's pharmacy. Patient has no questions after reviewing all instructions and will be going home. Tele and PIV removed. All personal belongings packed and taken with patient.     Brittany WILLINGHAM RN

## 2024-11-19 NOTE — TELEPHONE ENCOUNTER
Sita from the [facility] states patient needs to be seen within the week for Nephrology. The schedule is currently booked please call patient

## 2024-11-19 NOTE — DISCHARGE PLANNING
Patient was provided with discharge instructions, education, and follow up information. Printed prescriptions were given to patient, while 3 prescriptions are already filled and at patient's bedside. Patient verbalizes understanding of follow up information, specifically follow up appointments, dialysis and his dialysis schedule, use of eliquis coupon provided, when to take next dose of his medications. Patient has no questions after reviewing all instructions and will be going home once he is able to get a ride.     Verito ARRINGTON, Discharge Leader [id number]

## 2024-11-19 NOTE — TELEPHONE ENCOUNTER
Sita from the hospital states patient needs a hospital follow up within 1 to 3 weeks. The schedule is currently booked until February please call patient

## 2024-11-19 NOTE — PLAN OF CARE
Problem: Patient Centered Care  Goal: Patient preferences are identified and integrated in the patient's plan of care  Description: Interventions:  - What would you like us to know as we care for you? From home  - Provide timely, complete, and accurate information to patient/family  - Incorporate patient and family knowledge, values, beliefs, and cultural backgrounds into the planning and delivery of care  - Encourage patient/family to participate in care and decision-making at the level they choose  - Honor patient and family perspectives and choices  Outcome: Adequate for Discharge     Problem: Diabetes/Glucose Control  Goal: Glucose maintained within prescribed range  Description: INTERVENTIONS:  - Monitor Blood Glucose as ordered  - Assess for signs and symptoms of hyperglycemia and hypoglycemia  - Administer ordered medications to maintain glucose within target range  - Assess barriers to adequate nutritional intake and initiate nutrition consult as needed  - Instruct patient on self management of diabetes  Outcome: Adequate for Discharge     Problem: CARDIOVASCULAR - ADULT  Goal: Maintains optimal cardiac output and hemodynamic stability  Description: INTERVENTIONS:  - Monitor vital signs, rhythm, and trends  - Monitor for bleeding, hypotension and signs of decreased cardiac output  - Evaluate effectiveness of vasoactive medications to optimize hemodynamic stability  - Monitor arterial and/or venous puncture sites for bleeding and/or hematoma  - Assess quality of pulses, skin color and temperature  - Assess for signs of decreased coronary artery perfusion - ex. Angina  - Evaluate fluid balance, assess for edema, trend weights  Outcome: Adequate for Discharge  Goal: Absence of cardiac arrhythmias or at baseline  Description: INTERVENTIONS:  - Continuous cardiac monitoring, monitor vital signs, obtain 12 lead EKG if indicated  - Evaluate effectiveness of antiarrhythmic and heart rate control medications as  ordered  - Initiate emergency measures for life threatening arrhythmias  - Monitor electrolytes and administer replacement therapy as ordered  Outcome: Adequate for Discharge     Problem: METABOLIC/FLUID AND ELECTROLYTES - ADULT  Goal: Glucose maintained within prescribed range  Description: INTERVENTIONS:  - Monitor Blood Glucose as ordered  - Assess for signs and symptoms of hyperglycemia and hypoglycemia  - Administer ordered medications to maintain glucose within target range  - Assess barriers to adequate nutritional intake and initiate nutrition consult as needed  - Instruct patient on self management of diabetes  Outcome: Adequate for Discharge  Goal: Electrolytes maintained within normal limits  Description: INTERVENTIONS:  - Monitor labs and rhythm and assess patient for signs and symptoms of electrolyte imbalances  - Administer electrolyte replacement as ordered  - Monitor response to electrolyte replacements, including rhythm and repeat lab results as appropriate  - Fluid restriction as ordered  - Instruct patient on fluid and nutrition restrictions as appropriate  Outcome: Adequate for Discharge  Goal: Hemodynamic stability and optimal renal function maintained  Description: INTERVENTIONS:  - Monitor labs and assess for signs and symptoms of volume excess or deficit  - Monitor intake, output and patient weight  - Monitor urine specific gravity, serum osmolarity and serum sodium as indicated or ordered  - Monitor response to interventions for patient's volume status, including labs, urine output, blood pressure (other measures as available)  - Encourage oral intake as appropriate  - Instruct patient on fluid and nutrition restrictions as appropriate  Outcome: Adequate for Discharge     Problem: PAIN - ADULT  Goal: Verbalizes/displays adequate comfort level or patient's stated pain goal  Description: INTERVENTIONS:  - Encourage pt to monitor pain and request assistance  - Assess pain using appropriate pain  scale  - Administer analgesics based on type and severity of pain and evaluate response  - Implement non-pharmacological measures as appropriate and evaluate response  - Consider cultural and social influences on pain and pain management  - Manage/alleviate anxiety  - Utilize distraction and/or relaxation techniques  - Monitor for opioid side effects  - Notify MD/LIP if interventions unsuccessful or patient reports new pain  - Anticipate increased pain with activity and pre-medicate as appropriate  Outcome: Adequate for Discharge     Problem: SAFETY ADULT - FALL  Goal: Free from fall injury  Description: INTERVENTIONS:  - Assess pt frequently for physical needs  - Identify cognitive and physical deficits and behaviors that affect risk of falls.  - [place] fall precautions as indicated by assessment.  - Educate pt/family on patient safety including physical limitations  - Instruct pt to call for assistance with activity based on assessment  - Modify environment to reduce risk of injury  - Provide assistive devices as appropriate  - Consider OT/PT consult to assist with strengthening/mobility  - Encourage toileting schedule  Outcome: Adequate for Discharge     Problem: DISCHARGE PLANNING  Goal: Discharge to home or other facility with appropriate resources  Description: INTERVENTIONS:  - Identify barriers to discharge w/pt and caregiver  - Include patient/family/discharge partner in discharge planning  - Arrange for needed discharge resources and transportation as appropriate  - Identify discharge learning needs (meds, wound care, etc)  - Arrange for interpreters to assist at discharge as needed  - Consider post-discharge preferences of patient/family/discharge partner  - Complete POLST form as appropriate  - Assess patient's ability to be responsible for managing their own health  - Refer to Case Management Department for coordinating discharge planning if the patient needs post-hospital services based on  physician/LIP order or complex needs related to functional status, cognitive ability or social support system  Outcome: Adequate for Discharge
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
## 2024-11-20 NOTE — PROGRESS NOTES
NCM attempted to reach the patient to complete a Hospital follow up call. Left message to call back. [facility] provided direct contact info at [phone].

## 2024-12-02 NOTE — H&P
[facility]       CC:   Chief Complaint   Patient presents with    Cough/URI        PCP: None Pcp    Date of Admission: 12/2/2024  2:15 PM    ASSESSMENT / PLAN:     Troy Gustafson Sr. is a 54 year old male with PMH sig for type 2 DM on 70/30 with admission a few weeks ago for NSTEMI, afib with RVR, poorly controlled DM, worsening CKD started on HD who now presents with worsening fatigue, fever and hypoxia.    Hypoxia  Fever  RSV  - started on steroids  - pulm consulted  - wean O2 as able     CKD5 -> ESRD  - started on HD last admission  - renal consult, HD MWF  - did have HD today     Afib  - continue carvedilol and Eliquis    Elevated troponin  Recent NSTEMI  - [facility] consult, recommended repeating troponin x 1  - TTE with normal EF, no WMA  - recent stress test negative   - planned for outpatient ischemic work-up     Type2 DM  -last A1c 9.9 in 7/2024  -per pt takes 70/30 12 units BID  -titrate long acting  -SSI and titrate up as needed     HTN  - BP elevated in ER  - per daughter, was having issues with low BP at home and passed out at home, had hydralazine decreased to BID from TID  - continue norvasc, carvediolol, isosorbide, hydralazine    FN:  - IVF: none  - Diet: renal/diabetic    DVT Prophy: SCD, Eliquis  Lines: PIV    Dispo: pending clinical course    Outpatient records or previous hospital records reviewed.     Further recommendations pending patient's clinical course.  [facility] [place] to continue to follow patient while in house    Patient and/or patient's family given opportunity to ask questions and note understanding and agreeing with therapeutic plan as outlined    Anna Alex MD  [facility]  Answering Service number: [phone]    HPI       History of Present Illness:     Troy Gustafson Sr. is a 54 year old male with PMH sig for type 2 DM on 70/30 with admission a few weeks ago for NSTEMI, afib with RVR, poorly controlled DM, worsening CKD started on HD who now presents with  worsening fatigue, fever and hypoxia.    Daughter at bedside providing history given patient's fatigue. States he was doing OK on discharge for about 1 week but then was more fatigued. Had been having issues with passing out at home so PCP decreased hydralazine dose. Patient and family not sure when fevers started but think yesterday as he was complaining of feeling cold even when they turned the heat up to 80 degrees in the home. No other sick contacts. Does go to HD MWF. Denies chest pain. Had been compliant with medications until the past week, has not been able to eat much at home so has not been taking his medications.     In the ED, BP elevated, Tmax 101, O2 sat 85% on RA, placed on 3L NC. +RSV, started on nebs and steroids. Pulm consulted. Troponin elevated, cardiology consulted.     PMH  Past Medical History:    Diabetes (HCC)    Essential hypertension        PSH  History reviewed. No pertinent surgical history.     ALL:  Allergies[1]     Home Medications:  Medications Taking[2]      Soc Hx  Social History     Tobacco Use    Smoking status: Never    Smokeless tobacco: Never   Substance Use Topics    Alcohol use: Never        Fam Hx  No family history on file.    Review of Systems  Comprehensive ROS reviewed and negative except for what's stated above.     OBJECTIVE:  BP (!) 161/72   Pulse 81   Temp (!) 101.2 °F (38.4 °C) (Temporal)   Resp 25   Ht 6' 2\" (1.88 m)   Wt 260 lb (117.9 kg)   SpO2 95%   BMI 33.38 kg/m²     GEN: male in NAD, tired  HEENT: EOMI  Pulm: CTAB, no crackles or wheezes  CV: RRR, no murmurs  ABD: Soft, non-tender, non-distended, +BS  SKIN: warm, dry  EXT: trace edema BLE    Diagnostic Data:    CBC/Chem    Recent Labs   Lab 12/02/24  1506   RBC 3.92*   HGB 10.5*   HCT 33.2*   MCV 84.7   MCH 26.8   MCHC 31.6   RDW 13.5   NEPRELIM 4.22   WBC 6.1   .0         No results for input(s): \"GLU\", \"BUN\", \"CREATSERUM\", \"GFRAA\", \"GFRNAA\", \"EGFRCR\", \"CA\", \"NA\", \"K\", \"CL\", \"CO2\" in the last  168 hours.  Lab Results   Component Value Date    WBC 6.1 12/02/2024    HGB 10.5 12/02/2024    HCT 33.2 12/02/2024    .0 12/02/2024       No results for input(s): \"TROP\" in the last 168 hours.    Additional Diagnostics: ECG: afib    CXR: image personally reviewed    Radiology: XR CHEST AP PORTABLE  (CPT=71045)    Result Date: 12/2/2024  CONCLUSION:   1. Moderate-sized streaky opacity at the right lung base which could represent a recurrence/slowly resolving infiltrate with associated small pleural effusion.  2. Cardiac enlargement with secondary signs of slight fluid overload    Dictated by (CST): Bart Andre MD on 12/02/2024 at 2:52 PM     Finalized by (CST): Bart Andre MD on 12/02/2024 at 2:54 PM                    [1] No Known Allergies  [2]   No outpatient medications have been marked as taking for the 12/2/24 encounter (Hospital Encounter).

## 2024-12-02 NOTE — ED INITIAL ASSESSMENT (HPI)
Pt to ed c/o cough x over a wk and decrease appetite. Pt states he is on dialysis today. Denies chest pain or sob.

## 2024-12-02 NOTE — ED PROVIDER NOTES
Patient Seen in: [facility] Emergency Department    History     Chief Complaint   Patient presents with    Cough/URI       HPI    54-year-old male presents ER with complaint of weakness, decreased appetite.  Patient with past medical history of diabetes, hypertension on hemodialysis who recently completed hemodialysis prior to arrival.  Patient's daughter states that decreased appetite.  Patient arrives febrile.  Patient states he does still make urine has been on dialysis only for the past month.    History reviewed.   Past Medical History:    Diabetes (HCC)    Essential hypertension       History reviewed. History reviewed. No pertinent surgical history.      Medications :  Prescriptions Prior to Admission[1]     No family history on file.    Smoking Status:   Social History     Socioeconomic History    Marital status:    Tobacco Use    Smoking status: Never    Smokeless tobacco: Never   Substance and Sexual Activity    Alcohol use: Never    Drug use: Never       ROS  All pertinent positives for the review of systems are mentioned in the HPI  All other organ systems are reviewed and are negative.    Constitutional and vital signs reviewed.      Social History and Family History elements reviewed from today, pertinent positives to the presenting problem noted.    Physical Exam     ED Triage Vitals [12/02/24 1356]   BP (!) 194/84   Pulse 90   Resp 20   Temp (!) 101.2 °F (38.4 °C)   Temp src Temporal   SpO2 (!) 85 %   O2 Device None (Room air)       All measures to prevent infection transmission during my interaction with the patient were taken. The patient was already wearing a droplet mask on my arrival to the room. Personal protective equipment including droplet mask, eye protection, and gloves were worn throughout the duration of the exam.  Handwashing was performed prior to and after the exam.  Stethoscope and any equipment used during my examination was cleaned with super sani-cloth germicidal  wipes following the exam.     Physical Exam  Vitals and nursing note reviewed.   Constitutional:       Appearance: He is well-developed.   HENT:      Head: Normocephalic and atraumatic.      Right Ear: External ear normal.      Left Ear: External ear normal.      Nose: Nose normal.   Eyes:      Conjunctiva/sclera: Conjunctivae normal.      Pupils: Pupils are equal, round, and reactive to light.   Cardiovascular:      Rate and Rhythm: Normal rate and regular rhythm.      Heart sounds: Normal heart sounds.   Pulmonary:      Effort: Pulmonary effort is normal.      Breath sounds: Normal breath sounds.   Abdominal:      General: Bowel sounds are normal.      Palpations: Abdomen is soft.   Musculoskeletal:         General: Normal range of motion.      Cervical back: Normal range of motion and neck supple.   Skin:     General: Skin is warm and dry.   Neurological:      General: No focal deficit present.      Mental Status: He is alert and oriented to person, place, and time. Mental status is at baseline.      Cranial Nerves: No cranial nerve deficit.      Sensory: No sensory deficit.      Motor: No weakness.      Coordination: Coordination normal.      Deep Tendon Reflexes: Reflexes are normal and symmetric.   Psychiatric:         Behavior: Behavior normal.         Thought Content: Thought content normal.         Judgment: Judgment normal.         ED Course        Labs Reviewed   BASIC METABOLIC PANEL (8) - Abnormal; Notable for the following components:       Result Value    Glucose 116 (*)     Potassium 3.1 (*)     Creatinine 2.52 (*)     BUN/CREA Ratio 7.9 (*)     Calcium, Total 8.5 (*)     eGFR-Cr 29 (*)     All other components within normal limits   CBC WITH DIFFERENTIAL WITH PLATELET - Abnormal; Notable for the following components:    RBC 3.92 (*)     HGB 10.5 (*)     HCT 33.2 (*)     Lymphocyte Absolute 0.81 (*)     Monocyte Absolute 1.02 (*)     All other components within normal limits   TROPONIN I HIGH  SENSITIVITY - Abnormal; Notable for the following components:    Troponin I (High Sensitivity) 426 (*)     All other components within normal limits   LIPID PANEL - Abnormal; Notable for the following components:    HDL Cholesterol 25 (*)     All other components within normal limits   SARS-COV-2/FLU A AND B/RSV BY PCR (GENEXPERT) - Abnormal; Notable for the following components:    RSV by PCR Positive (*)     All other components within normal limits    Narrative:     This test is intended for the qualitative detection and differentiation of SARS-CoV-2, influenza A, influenza B, and respiratory syncytial virus (RSV) viral RNA in nasopharyngeal or nares swabs from individuals suspected of respiratory viral infection consistent with COVID-19 by their healthcare provider. Signs and symptoms of respiratory viral infection due to SARS-CoV-2, influenza, and RSV can be similar.                                    Test performed using the Xpert Xpress SARS-CoV-2/FLU/RSV (real time RT-PCR)  assay on the GeneXpert instrument, [employer], [place] [address].                   This test is being used under the Food and Drug Administration's Emergency Use Authorization.                                    The authorized Fact Sheet for Healthcare Providers for this assay is available upon request from the laboratory.   URINALYSIS WITH CULTURE REFLEX   RAINBOW DRAW BLUE         Imaging Results Available and Reviewed while in ED: XR CHEST AP PORTABLE  (CPT=71045)    Result Date: 12/2/2024  CONCLUSION:   1. Moderate-sized streaky opacity at the right lung base which could represent a recurrence/slowly resolving infiltrate with associated small pleural effusion.  2. Cardiac enlargement with secondary signs of slight fluid overload    Dictated by (CST): Bart Andre MD on 12/02/2024 at 2:52 PM     Finalized by (CST): Bart Andre MD on 12/02/2024 at 2:54 PM         ED Medications Administered:   Medications   acetaminophen  (Tylenol Extra Strength) tab 1,000 mg (1,000 mg Oral Not Given 12/2/24 1428)   acetaminophen (Tylenol Extra Strength) tab 1,000 mg (1,000 mg Oral Given 12/2/24 1401)   albuterol (Ventolin) (5 MG/ML) 0.5% nebulizer solution 10 mg (10 mg Nebulization Given 12/2/24 1543)   ipratropium (Atrovent) 0.02 % nebulizer solution 0.5 mg (0.5 mg Nebulization Given 12/2/24 1543)   methylPREDNISolone sodium succinate (Solu-MEDROL) injection 125 mg (125 mg Intravenous Given 12/2/24 1531)         MDM     Vitals:    12/02/24 1545 12/02/24 1600 12/02/24 1615 12/02/24 1630   BP: (!) 163/76 160/71 (!) 162/69 (!) 161/73   Pulse: 83 85 84 83   Resp: 22 24 22 20   Temp:       TempSrc:       SpO2: 94% 94% 99% 99%   Weight:       Height:         *I personally reviewed and interpreted all ED vitals.  I also personally reviewed all labs and imaging if ordered    Pulse Ox: 91%, Room air, Normal     Monitor Interpretation:   normal sinus rhythm    Differential Diagnosis/ Diagnostic Considerations: Bronchiolitis, RSV, pneumonia, CHF exacerbation, viral bronchitis    Medical Record Review: I personally reviewed available prior medical records for any recent pertinent discharge summaries, testing, and procedures and reviewed those reports.    Complicating Factors: The patient already has does not have any pertinent problems on file. to contribute to the complexity of this ED evaluation.    Medical Decision Making  54-year-old male presents ER with complaint of generalized weakness and shortness of breath requiring supplemental O2 in the ER.  Patient tested positive for RSV and likely has RSV bronchiolitis.  Patient's blood work within normal limits with slight hypokalemia.  Patient chest x-ray shows pleural effusion on the right lung base.  Discussed with duly hospitalist.  Patient to be admitted for further evaluation.  [facility] notified of the admission.  Patient given Solu-Medrol as well as hour-long breathing treatment in the ER.  [facility]  notified of the patient's elevated troponin.  Patient's family member bedside made aware of the disposition admission.    Total Critical Care Time: 34 minutes including time spent examining and re-evaluating the patient, ordering and reviewing laboratory tests, documenting, reviewing previous records, obtaining information from the family, and speaking with consultants, admitting doctors, nurses and medics and excludes any time spent on procedures.      Problems Addressed:  Acute bronchiolitis due to respiratory syncytial virus (RSV): acute illness or injury  Chronic renal failure, stage 4 (severe) (HCC): chronic illness or injury  Elevated troponin: acute illness or injury  Hypoxia: acute illness or injury  Pleural effusion: acute illness or injury    Amount and/or Complexity of Data Reviewed  Independent Historian:      Details: Medical history obtained from the family at bedside states he been complaining of weakness and lack of appetite for the past few days.  External Data Reviewed: labs, radiology, ECG and notes.     Details: Previous notes reviewed.  Patient with recent admission on 11/11/2024 for CHF exacerbation.  Labs: ordered. Decision-making details documented in ED Course.  Radiology: ordered and independent interpretation performed. Decision-making details documented in ED Course.     Details: Chest x-ray reviewed by myself shows right pleural effusion.        Condition upon leaving the department: Stable    Disposition and Plan     Clinical Impression:  1. Acute bronchiolitis due to respiratory syncytial virus (RSV)    2. Hypoxia    3. Chronic renal failure, stage 4 (severe) (HCC)    4. Pleural effusion    5. Elevated troponin        Disposition:  Admit    Follow-up:  No follow-up provider specified.    Medications Prescribed:  Current Discharge Medication List          Hospital Problems       Present on Admission  Date Reviewed: 7/30/2024            ICD-10-CM Noted POA    * (Principal) Acute  bronchiolitis due to respiratory syncytial virus (RSV) J21.0 12/2/2024 Unknown               [1] (Not in a hospital admission)

## 2024-12-02 NOTE — CONSULTS
Cardiology (consult dictated)    Assessment:  1.  Acute RSV pneumonia.    2.  End-stage renal disease, on hemodialysis.    3.  Elevated troponins.  Chronic elevation, type II MI secondary to acute viral illness, renal failure and tachycardia.  Troponins also elevated during admission 2 weeks ago.    4.  Normal nuclear stress test and echocardiogram during admissions in recent weeks.    5.  Paroxysmal atrial fibrillation on recent admission.  Now in atrial flutter with heart rate of 110.  On Eliquis.      Plan:  1.  Draw 1 more troponin to document lack of excessive rise.    2.  No additional rate control medicine necessary if rate stays below 110.  If consistently above that, could give doses of diltiazem until acute illness resolves.      Thank you

## 2024-12-02 NOTE — ED QUICK NOTES
Orders for admission, patient is aware of plan and ready to go upstairs. Any questions, please call ED MURPHY burk at extension [phone].     Patient Covid vaccination status: Unvaccinated     COVID Test Ordered in ED: SARS-CoV-2/Flu A and B/RSV by PCR (GeneXpert)    COVID Suspicion at Admission: N/A    Running Infusions:  None    Mental Status/LOC at time of transport: aox4    Other pertinent information: 3L NC  CIWA score: N/A   NIH score:  N/A
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
## 2024-12-02 NOTE — TELEPHONE ENCOUNTER
Action Requested: Summary for Provider     []  Critical Lab, Recommendations Needed  [] Need Additional Advice  []   FYI    []   Need Orders  [] Need Medications Sent to Pharmacy  [x]  Other     SUMMARY: Daughter of patient (on release of information) states that patient was having dizziness and passing out a couple days ago- had medications adjusted by other provider, however, that today, he is having disorientation, weakness, lethargy, and inability to eat or keep food down.    She mentioned that patient was unable to even turn the heat on in the car today due to confusion.    She was advised that patient needs to be seen in the emergency room today.    She states that she will have family member bring patient to the emergency room today.    Reason for call: Acute  Onset: Data Unavailable      Reason for Disposition   Nursing judgment    Protocols used: No Protocol [id number]

## 2024-12-03 NOTE — PROGRESS NOTES
[facility] Hospitalist Progress Note     CC: Hospital Follow up    PCP: None Pcp       Assessment/Plan:     Principal Problem:    Acute bronchiolitis due to respiratory syncytial virus (RSV)  Active Problems:    Elevated troponin    Pleural effusion    Hypoxia    Chronic renal failure, stage 4 (severe) (HCC)      Troy Gustafson Sr. is a 54 year old male with PMH sig for type 2 DM on 70/30 with admission a few weeks ago for NSTEMI, afib with RVR, poorly controlled DM, worsening CKD started on HD who now presents with worsening fatigue, fever and hypoxia.     Hypoxia- improving  Fever  RSV  - started on steroids in ER, no need to continue per pulm  - pulm consulted  - wean O2 as able   - home O2 eval prior to discharge     CKD5 -> ESRD  - started on HD last admission  - renal consult, HD MWF  - did have HD day of admission 12/2  - due for HD tomorrow     Afib  - continue carvedilol and Eliquis     Elevated troponin  Recent NSTEMI  - [facility] consult, recommended repeating troponin x 1  - TTE with normal EF, no WMA  - recent stress test negative   - planned for outpatient ischemic work-up     Type 2 DM  -last A1c 9.9 in 7/2024  -per pt takes 70/30 12 units BID  -titrate long acting  -SSI and titrate up as needed     HTN  - BP elevated in ER  - per daughter, was having issues with low BP at home and passed out at home, had hydralazine decreased to BID from TID  - continue norvasc, carvediolol, isosorbide, hydralazine     FN:  - IVF: none  - Diet: renal/diabetic     DVT Prophy: SCD, Eliquis  Lines: PIV     Dispo: pending clinical course     Outpatient records or previous hospital records reviewed.      Further recommendations pending patient's clinical course.  [facility] hospitalist to continue to follow patient while in house     Patient and/or patient's family given opportunity to ask questions and note understanding and agreeing with therapeutic plan as outlined     MD SALVATORE [facility]G Hospitalist  Answering Service number:  [phone]     Subjective:     Feels better, hoping to go home today.     OBJECTIVE:    Blood pressure 155/89, pulse 61, temperature 98.4 °F (36.9 °C), temperature source Oral, resp. rate 18, height 6' 2\" (1.88 m), weight 260 lb 6.4 oz (118.1 kg), SpO2 96%.    Temp:  [98.2 °F (36.8 °C)-101.2 °F (38.4 °C)] 98.4 °F (36.9 °C)  Pulse:  [] 61  Resp:  [18-35] 18  BP: (139-194)/() 155/89  SpO2:  [85 %-99 %] 96 %      Intake/Output:    Intake/Output Summary (Last 24 hours) at 12/3/2024 1240  Last data filed at 12/3/2024 0845  Gross per 24 hour   Intake 850 ml   Output 0 ml   Net 850 ml       Last 3 Weights   12/02/24 1753 260 lb 6.4 oz (118.1 kg)   12/02/24 1356 260 lb (117.9 kg)   11/19/24 0503 256 lb 14.4 oz (116.5 kg)   11/18/24 0500 258 lb 14.4 oz (117.4 kg)   11/17/24 0910 265 lb (120.2 kg)   11/16/24 0545 270 lb (122.5 kg)   11/14/24 0618 264 lb 4.8 oz (119.9 kg)   11/13/24 0514 279 lb 12.8 oz (126.9 kg)   11/12/24 0300 289 lb 4.8 oz (131.2 kg)   11/11/24 0600 292 lb 1.6 oz (132.5 kg)   11/10/24 1831 294 lb (133.4 kg)   11/10/24 1237 (!) 304 lb 7.3 oz (138.1 kg)   11/10/24 1207 265 lb (120.2 kg)   07/30/24 1128 275 lb (124.7 kg)       Exam   GEN: male in NAD, more awake today  HEENT: EOMI  Pulm: CTAB, no crackles or wheezes  CV: RRR, no murmurs  ABD: Soft, non-tender, non-distended, +BS  SKIN: warm, dry  EXT: trace edema BLE      Data Review:       Labs:     Recent Labs   Lab 12/02/24  1506   RBC 3.92*   HGB 10.5*   HCT 33.2*   MCV 84.7   MCH 26.8   MCHC 31.6   RDW 13.5   NEPRELIM 4.22   WBC 6.1   .0         Recent Labs   Lab 12/02/24  1506 12/03/24  0445   * 242*   BUN 20 35*   CREATSERUM 2.52* 3.63*   EGFRCR 29* 19*   CA 8.5* 8.7    135*   K 3.1* 4.0    99   CO2 28.0 29.0       No results for input(s): \"ALT\", \"AST\", \"ALB\", \"AMYLASE\", \"LIPASE\", \"LDH\" in the last 168 hours.    Invalid input(s): \"ALPHOS\", \"TBIL\", \"DBIL\", \"TPROT\"      Imaging:  XR CHEST AP PORTABLE   (CPT=71045)    Result Date: 12/2/2024  CONCLUSION:   1. Moderate-sized streaky opacity at the right lung base which could represent a recurrence/slowly resolving infiltrate with associated small pleural effusion.  2. Cardiac enlargement with secondary signs of slight fluid overload    Dictated by (CST): Bart Andre MD on 12/02/2024 at 2:52 PM     Finalized by (CST): Bart Andre MD on 12/02/2024 at 2:54 PM             Meds:     INPATIENT MEDICATIONS    Scheduled Medications:      albuterol, 2.5 mg, Q6H WA  insulin degludec, 10 Units, Daily  insulin aspart, 3 Units, TID CC  insulin aspart, 1-5 Units, TID CC  amLODIPine, 10 mg, Daily  carvedilol, 12.5 mg, BID with meals  furosemide, 40 mg, Daily  hydrALAZINE, 25 mg, BID  isosorbide dinitrate, 20 mg, TID (Nitrates)  rosuvastatin, 5 mg, Nightly  apixaban, 5 mg, BID            Drips:       PRN Medications  glucose, 15 g, Q15 Min PRN   Or  glucose, 15 g, Q15 Min PRN   Or  glucose-vitamin C, 4 tablet, Q15 Min PRN   Or  dextrose, 50 mL, Q15 Min PRN   Or  glucose, 30 g, Q15 Min PRN   Or  glucose, 30 g, Q15 Min PRN   Or  glucose-vitamin C, 8 tablet, Q15 Min PRN  acetaminophen, 500 mg, Q4H PRN  ondansetron, 4 mg, Q6H PRN  prochlorperazine, 5 mg, Q8H PRN

## 2024-12-03 NOTE — PROGRESS NOTES
12/03/24 1510   Mobility   O2 walk? Yes   SPO2% on Room Air at Rest 97   SPO2% Ambulation on Room Air 90

## 2024-12-03 NOTE — PROGRESS NOTES
[facility]  Cardiology Progress Note    Troy Gustafson Sr. Patient Status:  Inpatient    3/11/1970 MRN [id number]   Location [facility]5W Attending Anna Alex MD   Hosp Day # 1 PCP None Pcp     Subjective     Feeling better today.    Objective:   Patient Vitals for the past 24 hrs:   BP Temp Temp src Pulse Resp SpO2 Height Weight   24 0832 155/89 98.4 °F (36.9 °C) Oral 71 18 97 % -- --   24 0410 139/76 98.6 °F (37 °C) Oral 81 19 94 % -- --   24 2354 148/80 98.2 °F (36.8 °C) Oral 72 18 97 % -- --   24 2048 159/81 98.6 °F (37 °C) Oral 105 20 91 % -- --   24 1929 159/88 -- -- 74 -- -- -- --   24 1900 -- -- -- 98 -- -- -- --   24 1753 -- -- -- -- -- -- -- 260 lb 6.4 oz (118.1 kg)   24 1746 151/79 99.1 °F (37.3 °C) Oral 64 24 92 % -- --   24 1715 (!) 179/75 -- -- 91 22 96 % -- --   24 1700 145/78 -- -- 115 22 96 % -- --   24 1645 (!) 161/68 -- -- 84 26 96 % -- --   24 1630 (!) 161/73 -- -- 83 20 99 % -- --   24 1615 (!) 162/69 -- -- 84 22 99 % -- --   24 1600 160/71 -- -- 85 24 94 % -- --   24 1545 (!) 163/76 -- -- 83 22 94 % -- --   24 1544 -- 99.5 °F (37.5 °C) Oral -- -- -- -- --   24 1530 (!) 161/72 -- -- 81 25 95 % -- --   24 1515 (!) 167/77 -- -- 85 19 90 % -- --   24 1505 (!) 161/122 -- -- 86 21 91 % -- --   24 1430 (!) 187/87 -- -- 86 (!) 35 93 % -- --   24 1403 -- -- -- -- -- 91 % -- --   24 1356 (!) 194/84 (!) 101.2 °F (38.4 °C) Temporal 90 20 (!) 85 % 6' 2\" (1.88 m) 260 lb (117.9 kg)     Intake/Output:   Last 3 shifts:   Intake/Output                   24 0700 - 24 0659 (Not Admitted) 24 07 - 24 0659 24 07 - 24 0659       Intake    P.O.  --  200  640    P.O. -- 200 640    I.V.  --  10  --    I.V. -- 10 --    Total Intake -- 210 640       Output    Urine  --  0  --    Urine -- 0 --    Total Output -- 0  --       Net I/O     -- 210 640             Scheduled Meds:    albuterol  2.5 mg Nebulization Q6H WA    insulin degludec  10 Units Subcutaneous Daily    insulin aspart  3 Units Subcutaneous TID CC    insulin aspart  1-5 Units Subcutaneous TID CC    amLODIPine  10 mg Oral Daily    carvedilol  12.5 mg Oral BID with meals    furosemide  40 mg Oral Daily    hydrALAZINE  25 mg Oral BID    isosorbide dinitrate  20 mg Oral TID (Nitrates)    rosuvastatin  5 mg Oral Nightly    apixaban  5 mg Oral BID       Results:     Lab Results   Component Value Date    WBC 6.1 12/02/2024    HGB 10.5 (L) 12/02/2024    HCT 33.2 (L) 12/02/2024    .0 12/02/2024    CREATSERUM 3.63 (H) 12/03/2024    BUN 35 (H) 12/03/2024     (L) 12/03/2024    K 4.0 12/03/2024    CL 99 12/03/2024    CO2 29.0 12/03/2024     (H) 12/03/2024    CA 8.7 12/03/2024    ALB 3.6 11/19/2024    ALKPHO 108 07/21/2024    ALKPHO 110 07/21/2024    BILT 0.5 07/21/2024    BILT 0.4 07/21/2024    TP 6.7 07/21/2024    TP 6.5 07/21/2024    AST 22 11/13/2024    ALT 68 (H) 11/13/2024    PTT 42.5 (H) 11/15/2024    TSH 2.741 11/13/2024    MG 2.1 11/17/2024    PHOS 5.1 11/19/2024     (H) 07/23/2024       Recent Labs   Lab 12/02/24  1506 12/03/24  0445   * 242*   BUN 20 35*   CREATSERUM 2.52* 3.63*   CA 8.5* 8.7    135*   K 3.1* 4.0    99   CO2 28.0 29.0     Recent Labs   Lab 12/02/24  1506   RBC 3.92*   HGB 10.5*   HCT 33.2*   MCV 84.7   MCH 26.8   MCHC 31.6   RDW 13.5   NEPRELIM 4.22   WBC 6.1   .0       No results for input(s): \"BNPML\" in the last 168 hours.    No results for input(s): \"TROP\", \"CK\" in the last 168 hours.    XR CHEST AP PORTABLE  (CPT=71045)    Result Date: 12/2/2024  CONCLUSION:   1. Moderate-sized streaky opacity at the right lung base which could represent a recurrence/slowly resolving infiltrate with associated small pleural effusion.  2. Cardiac enlargement with secondary signs of slight fluid overload    Dictated  by (CST): Bart Andre MD on 2024 at 2:52 PM     Finalized by (CST): Bart Andre MD on 2024 at 2:54 PM         EKG 12 Lead    Result Date: 12/3/2024  Atrial flutter with variable A-V block Left anterior fascicular block LVH with repolarization abnormality ( R in aVL , [place] product ) Abnormal ECG When compared with ECG of 10-NOV-2024 12:09, Vent. rate has increased BY  46 BPM T wave inversion less evident in Lateral leads Confirmed by Bebeto Allen ([id number]) on 12/3/2024 7:13:26 AM   CARD ECHO 2D DOPPLER (CPT=93306)    Result Date: 2024  Transthoracic Echocardiogram Name:Troy Gustafson Date: 2024 :  1970 Ht:  (75in)  BP: 160 / 73 MRN:  [id number]    Age:  54years    Wt:  (279lb) HR: 64bpm Loc:  [facility]       Gndr: M          BSA: 2.53m^2 Sonographer: Shabbir HECTOR Ordering:    Marry Lord Consulting:  Philip Dooley ---------------------------------------------------------------------------- History/Indications:   Edema.  Congestive heart failure.  Risk factors: Hypertension. New Atrial Flutter. Shortness of breath. Blood tests:    Troponin elevated. ---------------------------------------------------------------------------- Procedure information:  A transthoracic complete 2D study was performed. Additional evaluation included M-mode, complete spectral Doppler, and color Doppler.  Patient status:  Inpatient.  Location:  Bedside.    Comparison was made to the study of 2024.    This was a routine study. Transthoracic echocardiography for diagnosis and ventricular function evaluation. Image quality was adequate. ECG rhythm:   Atrial flutter ---------------------------------------------------------------------------- Conclusions: 1. Left ventricle: The cavity size was normal. Wall thickness was mildly    increased. Systolic function was normal. The estimated ejection fraction    was 60-65%, by visual assessment. Wall motion is normal; there are no    regional wall  motion abnormalities. Unable to assess LV diastolic    function due to heart rhythm. 2. Mitral valve: There was mild regurgitation. 3. Left atrium: The atrium was mildly dilated. Impressions:  No significant change since prior study. * ---------------------------------------------------------------------------- * Findings: Left ventricle:  The cavity size was normal. Wall thickness was mildly increased. Systolic function was normal. The estimated ejection fraction was 60-65%, by visual assessment. Wall motion is normal; there are no regional wall motion abnormalities. Unable to assess LV diastolic function due to heart rhythm. Left atrium:  The atrium was mildly dilated. Right ventricle:  The cavity size was normal. Systolic function was normal. Right atrium:  Well visualized. The atrium was normal in size. Mitral valve:  The leaflets were mildly calcified. Leaflet separation was normal.  Doppler:  Transvalvular velocity was within the normal range. There was no evidence for stenosis. There was mild regurgitation.    The valve area by pressure half-time was 2.82cm^2. The valve area index by pressure half-time was 1.12cm^2/m^2. Aortic valve:   The valve was trileaflet. The leaflets were mildly calcified. Cusp separation was normal.  Doppler:  Transvalvular velocity was within the normal range. There was no evidence for stenosis. There was no significant regurgitation.    The peak systolic gradient was 11mm Hg. Tricuspid valve:  The valve is structurally normal. Leaflet separation was normal.  Doppler:  Transvalvular velocity was within the normal range. There was no evidence for stenosis. There was trace regurgitation. Pulmonic valve:   The valve is structurally normal. Cusp separation was normal.  Doppler:  Transvalvular velocity was within the normal range. There was no evidence for stenosis. There was no significant regurgitation. Pericardium:   There was no pericardial effusion. Aorta: Aortic root: The aortic  root was at the upper limits of normal. Ascending aorta: The ascending aorta was normal. Pulmonary arteries: Systolic pressure could not be accurately estimated. Systemic veins:  Central venous respirophasic diameter changes are blunted (< 50%). Inferior vena cava: The IVC was dilated. ---------------------------------------------------------------------------- Measurements  Left ventricle       Value          Ref       07/22/2024  IVS thickness,   (H) 1.2   cm       0.6 - 1.0 1.2  ED, PLAX  LV ID, ED, PLAX      4.8   cm       4.2 - 5.8 4.7  LV ID, ES, PLAX      3.1   cm       2.5 - 4.0 3.2  LV PW thickness, (H) 1.2   cm       0.6 - 1.0 1.2  ED, PLAX  IVS/LV PW ratio,     1.00           --------- 1.00  ED, PLAX  LV PW/LV ID          0.25           --------- 0.26  ratio, ED, PLAX  LV ejection          65    %        52 - 72   60  fraction  Stroke               30    ml/m^2   --------- 26  volume/bsa, 2D  LVOT                 Value          Ref       07/22/2024  LVOT ID              2     cm       --------- 2.1  LVOT peak            1.29  m/sec    --------- 1.03  velocity, S  LVOT VTI, S          24.2  cm       --------- 18.9  LVOT peak            7     mm Hg    --------- 4  gradient, S  LVOT mean            3     mm Hg    --------- 2  gradient, S  Stroke volume        76    ml       --------- 65  (SV), LVOT DP  Stroke index         30    ml/m^2   --------- 27  (SV/bsa), LVOT  DP  Aortic valve         Value          Ref       07/22/2024  Aortic valve         1.63  m/sec    --------- ----------  peak velocity, S  Aortic peak          11    mm Hg    --------- ----------  gradient, S  Velocity ratio,      0.79           --------- ----------  peak, LVOT/AV  Aortic root          Value          Ref       07/22/2024  Aortic root ID       3.8   cm       3.1 - 4.5 3.6  Aortic root ID,      3.6   cm       3.1 - 4.5 ----------  ED  Ascending aorta      Value          Ref       07/22/2024  Ascending aorta      3.4   cm       2.2  - 3.8 3.1  ID  Left atrium          Value          Ref       07/22/2024  LA volume, S     (H) 95    ml       18 - 58   75  LA volume/bsa, S (H) 38    ml/m^2   16 - 34   31  LA volume, ES,   (H) 95    ml       18 - 58   68  1-p A4C  LA volume, ES,   (H) 91    ml       18 - 58   78  1-p A2C  LA volume, ES,       101   ml       --------- 83  A/L  LA volume/bsa,   (H) 40    ml/m^2   16 - 34   34  ES, A/L  Mitral valve         Value          Ref       07/22/2024  Mitral E-wave        0.77  m/sec    --------- 1.11  peak velocity  Mitral               266   ms       --------- 173  deceleration  time  Mitral pressure      78    ms       --------- ----------  half-time  Mitral peak          2     mm Hg    --------- 5  gradient, D  Mitral valve         2.82  cm^2     --------- ----------  area, PHT, DP  Mitral valve         1.12  cm^2/m^2 --------- ----------  area/bsa, PHT,  DP  Right atrium         Value          Ref       07/22/2024  RA ID, S-I, ES,  (H) 5.7   cm       3.4 - 5.3 ----------  A4C  RA ID/bsa, S-I,      2.3   cm/m^2   1.8 - 3.0 ----------  ES, A4C  RA area, ES, A4C (H) 20    cm^2     10 - 18   ----------  RA volume, ES,       62    ml       --------- ----------  1-p A4C  RA volume/bsa,       24    ml/m^2   11 - 39   ----------  ES, 1-p A4C  Systemic veins       Value          Ref       07/22/2024  Estimated CVP        15    mm Hg    --------- 10  Inferior vena        Value          Ref       07/22/2024  cava  ID               (H) 2.8   cm       <=2.1     2.2  Right ventricle      Value          Ref       07/22/2024  TAPSE, 2D            2.31  cm       >=1.70    2.28  TAPSE, MM            2.31  cm       >=1.70    2.28  RV s', lateral       13.6  cm/sec   >=9.5     14.4 Legend: (L)  and  (H)  rodrigo values outside specified reference range. ---------------------------------------------------------------------------- Prepared and electronically signed by Ross Hanley 11/14/2024 10:05        Exam:     General:  Alert and oriented x 3. No apparent distress.   HEENT: Normocephalic, anicteric sclera, neck supple, no thyromegaly or adenopathy.  Neck: No JVD, carotids 2+, no bruits.  Cardiac: Regular rate and rhythm. S1, S2 normal. No murmur, pericardial rub, S3, or extra cardiac sounds.  Lungs: Clear without wheezes, rales, rhonchi or dullness.  Normal excursions and effort.  Abdomen: Soft, non-tender. No organosplenomegally, mass or rebound, BS-present.  Extremities: Without clubbing or cyanosis. No edema.    Neurologic: Alert and oriented, normal affect. No focal defects  Skin: Warm and dry.     Assessment and Plan:   Assessment:  1.  Acute RSV pneumonia.     2.  End-stage renal disease, on hemodialysis.     3.  Elevated troponins.  Chronic elevation, type II MI secondary to acute viral illness, renal failure and tachycardia.  Troponins also elevated during prior admissions.     4.  Normal nuclear stress test and echocardiogram in July.     5.  Paroxysmal atrial fibrillation on recent admission.  Now in atrial flutter with heart rate controlled.  On Eliquis.        Plan:  1.  May benefit from outpatient coronary CT now that he has been initiated hemodialysis.  Can arrange this through our office after I see him at our follow-up visit.    2.  Continue current antihypertensive medications for now.  Per history has had quite variable blood pressure including syncopal episode for hypotension.    Ross Hanley MD  [facility]  12/3/2024

## 2024-12-03 NOTE — PLAN OF CARE
Problem: Diabetes/Glucose Control  Goal: Glucose maintained within prescribed range  Description: INTERVENTIONS:  - Monitor Blood Glucose as ordered  - Assess for signs and symptoms of hyperglycemia and hypoglycemia  - Administer ordered medications to maintain glucose within target range  - Assess barriers to adequate nutritional intake and initiate nutrition consult as needed  - Instruct patient on self management of diabetes  12/3/2024 1701 by Nusrat Mckenzie, RN  Outcome: Progressing  12/3/2024 1701 by Nusrat Mckenzie, RN  Outcome: Progressing    Problem: CARDIOVASCULAR - ADULT  Goal: Maintains optimal cardiac output and hemodynamic stability  Description: INTERVENTIONS:  - Monitor vital signs, rhythm, and trends  - Monitor for bleeding, hypotension and signs of decreased cardiac output  - Evaluate effectiveness of vasoactive medications to optimize hemodynamic stability  - Monitor arterial and/or venous puncture sites for bleeding and/or hematoma  - Assess quality of pulses, skin color and temperature  - Assess for signs of decreased coronary artery perfusion - ex. Angina  - Evaluate fluid balance, assess for edema, trend weights  Outcome: Progressing  Goal: Absence of cardiac arrhythmias or at baseline  Description: INTERVENTIONS:  - Continuous cardiac monitoring, monitor vital signs, obtain 12 lead EKG if indicated  - Evaluate effectiveness of antiarrhythmic and heart rate control medications as ordered  - Initiate emergency measures for life threatening arrhythmias  - Monitor electrolytes and administer replacement therapy as ordered  Outcome: Progressing     Problem: RESPIRATORY - ADULT  Goal: Achieves optimal ventilation and oxygenation  Description: INTERVENTIONS:  - Assess for changes in respiratory status  - Assess for changes in mentation and behavior  - Position to facilitate oxygenation and minimize respiratory effort  - Oxygen supplementation based on oxygen saturation or ABGs  - Provide  Smoking Cessation handout, if applicable  - Encourage broncho-pulmonary hygiene including cough, deep breathe, Incentive Spirometry  - Assess the need for suctioning and perform as needed  - Assess and instruct to report SOB or any respiratory difficulty  - Respiratory Therapy support as indicated  - Manage/alleviate anxiety  - Monitor for signs/symptoms of CO2 retention  Outcome: Progressing

## 2024-12-03 NOTE — PLAN OF CARE
Troy is A&Ox4 and on 3L NC. Monitoring blood glucose levels per order- ACHS, saline locked, voiding independently, tolerating diet, no complaints of pain. Frequent rounding by nursing staff. Safety precautions maintained/call light within reach.     Problem: Patient Centered Care  Goal: Patient preferences are identified and integrated in the patient's plan of care  Description: Interventions:  - What would you like us to know as we care for you?   - Provide timely, complete, and accurate information to patient/family  - Incorporate patient and family knowledge, values, beliefs, and cultural backgrounds into the planning and delivery of care  - Encourage patient/family to participate in care and decision-making at the level they choose  - Honor patient and family perspectives and choices  Outcome: Progressing     Problem: Diabetes/Glucose Control  Goal: Glucose maintained within prescribed range  Description: INTERVENTIONS:  - Monitor Blood Glucose as ordered  - Assess for signs and symptoms of hyperglycemia and hypoglycemia  - Administer ordered medications to maintain glucose within target range  - Assess barriers to adequate nutritional intake and initiate nutrition consult as needed  - Instruct patient on self management of diabetes  Outcome: Progressing     Problem: Patient/Family Goals  Goal: Patient/Family Long Term Goal  Description: Patient's Long Term Goal: Get diabetes under control    Interventions:  - carb controlled diet, manage blood sugar, MAR  - See additional Care Plan goals for specific interventions  Outcome: Progressing  Goal: Patient/Family Short Term Goal  Description: Patient's Short Term Goal: Discharge home    Interventions:   - Point of care, MAR  - See additional Care Plan goals for specific interventions  Outcome: Progressing

## 2024-12-03 NOTE — CONSULTS
Troy Gustafson Sr. Patient Status:  Inpatient    3/11/1970 MRN [id number]   Location [facility]5W Attending Anna Alex MD   Hosp Day # 1 PCP None Pcp        Date of Admission: 2024  2:15 PM  Admission Diagnosis: Acute bronchiolitis due to respiratory syncytial virus (RSV) [J21.0]  Pleural effusion [J90]  Hypoxia [R09.02]  Elevated troponin [R79.89]  Chronic renal failure, stage 4 (severe) (HCC) [N18.4]  Reason for Consult: respiratory failure     History of Present Illness: Patient is 53 y/o man PMH CAD s/p NSTEMI recently, Afib, HTN, DM, CKD, SRINIVASAN who presents with dyspnea and cough for 2 days.  His cough is nonproductive.  He denies sick contacts.  He denies fever at home but had low grade fever in ED.  He denies cp, chest tightness, wheezing.  He feels better today.       Past Medical History:    Diabetes (HCC)    Essential hypertension      History reviewed. No pertinent surgical history.   Allergies[1]     Social History:   reports that he has never smoked. He has never used smokeless tobacco. He reports that he does not drink alcohol and does not use drugs.        Family History:  No family history on file.        Medications:  Reviewed in EMR.     REVIEW OF SYSTEMS:   12 point review of systems negative except as mentioned in HPI.     OBJECTIVE:  Patient Vitals for the past 24 hrs:   BP Temp Temp src Pulse Resp SpO2 Height Weight   24 0410 139/76 98.6 °F (37 °C) Oral 81 19 94 % -- --   24 2354 148/80 98.2 °F (36.8 °C) Oral 72 18 97 % -- --   24 2048 159/81 98.6 °F (37 °C) Oral 105 20 91 % -- --   24 1929 159/88 -- -- 74 -- -- -- --   24 1900 -- -- -- 98 -- -- -- --   24 1753 -- -- -- -- -- -- -- 260 lb 6.4 oz (118.1 kg)   24 1746 151/79 99.1 °F (37.3 °C) Oral 64 24 92 % -- --   24 1715 (!) 179/75 -- -- 91 22 96 % -- --   24 1700 145/78 -- -- 115 22 96 % -- --   24 1645 (!) 161/68 -- -- 84 26 96 % -- --   24 1630 (!)  161/73 -- -- 83 20 99 % -- --   12/02/24 1615 (!) 162/69 -- -- 84 22 99 % -- --   12/02/24 1600 160/71 -- -- 85 24 94 % -- --   12/02/24 1545 (!) 163/76 -- -- 83 22 94 % -- --   12/02/24 1544 -- 99.5 °F (37.5 °C) Oral -- -- -- -- --   12/02/24 1530 (!) 161/72 -- -- 81 25 95 % -- --   12/02/24 1515 (!) 167/77 -- -- 85 19 90 % -- --   12/02/24 1505 (!) 161/122 -- -- 86 21 91 % -- --   12/02/24 1430 (!) 187/87 -- -- 86 (!) 35 93 % -- --   12/02/24 1403 -- -- -- -- -- 91 % -- --   12/02/24 1356 (!) 194/84 (!) 101.2 °F (38.4 °C) Temporal 90 20 (!) 85 % 6' 2\" (1.88 m) 260 lb (117.9 kg)     O2 requirement: 2L    Wt Readings from Last 3 Encounters:   12/02/24 260 lb 6.4 oz (118.1 kg)   11/19/24 256 lb 14.4 oz (116.5 kg)   07/30/24 275 lb (124.7 kg)        No intake/output data recorded.  I/O this shift:  In: 210 [P.O.:200; I.V.:10]  Out: 0     Physical Exam:   General: no distress   Lungs: clear bilaterally   Heart: Regular rate and rhythm, normal S1S2, no murmur.   Extremity: no edema, no cyanosis       Lab Results   Component Value Date    WBC 6.1 12/02/2024    HGB 10.5 12/02/2024    HCT 33.2 12/02/2024    .0 12/02/2024    CREATSERUM 3.63 12/03/2024    BUN 35 12/03/2024     12/03/2024    K 4.0 12/03/2024    CL 99 12/03/2024    CO2 29.0 12/03/2024     12/03/2024    CA 8.7 12/03/2024    PGLU 209 12/02/2024           Imaging: Reviewed.     ASSESSMENT/PLAN:  Acute hypoxic respiratory failure:  secondary to RSV pneumonia  - wean O2  - s/p IV steroids in ED, no indication to continue at this time  - albuterol metanebs  - IS, flutter  ID:  RSV pneumonia, clinically improving, no evidence of bacterial superinfection at this time  - monitor off abx  - f/u CXR 2 months  - supportive care  Abnormal CXR:  RLL infiltrate in setting of RSV pneumonia, very small R pleural effusion which was previously larger  - outpt follow up CXR 2 months  CV:  AFib with RVR, CAD, elevated troponin  - per cardiology  Suspected  SRINIVASAN  - outpt PSG  Proph  - NOAC  Dispo  - will follow    Discussed with patient and daughter in detail, all questions answered.     Micha Fine MD  12/3/2024  6:55 AM       [1] No Known Allergies

## 2024-12-03 NOTE — CONSULTS
[facility]    PATIENT'S NAME: ROBBIE BRENNER SR.   ATTENDING PHYSICIAN: Chapito Reeder DO   CONSULTING PHYSICIAN: Micha Clark MD   PATIENT ACCOUNT#:   [id number]    LOCATION:  [facility]  MEDICAL RECORD #:   [id number]       YOB: 1970  ADMISSION DATE:       12/02/2024      CONSULT DATE:  12/02/2024    REPORT OF CONSULTATION      HISTORY OF PRESENT ILLNESS:  The patient is a 54-year-old man who has had no appetite for the past 4 days and then over the last couple of days has developed a dry cough, shortness of breath, and chills.  He has been found to be positive for RSV pneumonia and is being admitted.  We were called to see him for cardiology consultation because of rapid atrial fibrillation/flutter and elevated troponins.    PAST MEDICAL HISTORY:  End-stage renal disease, hypertension, dyslipidemia, diabetes type 2.    MEDICATIONS:  Home medications:  Furosemide 40 mg daily, isosorbide dinitrate 20 mg t.i.d., Eliquis 2.5 b.i.d., hydralazine 25 t.i.d., rosuvastatin 5 at bedtime, carvedilol 12.5 b.i.d., amlodipine 10 mg q.a.m., and insulin.    ALLERGIES:  No known drug allergies.    SOCIAL HISTORY:  He is single.  He has 7 children, 6 girls and 1 son.  He works as a .  He has been told that he snores but has not had sleep testing, no surgeries.  He is dialyzed Monday, Wednesday, Friday and had dialysis this morning.    REVIEW OF SYSTEMS:  Otherwise, negative.      PHYSICAL EXAMINATION:    GENERAL:  Well-developed, well-nourished male in no acute distress.  Alert and oriented x3.  Has a cough.  VITAL SIGNS:  Temperature 101.2, heart rate 109, blood pressure 160/68.  NECK:  Veins normal.  Carotids brisk without bruits.  No thyromegaly.  LUNGS:  Basilar crackles.  HEART:  Heart S1, S2 normal.  No gallop, murmur, rub, or click.  ABDOMEN:  Soft, nontender, benign.  EXTREMITIES:  Warm and dry.  There is 1+ chronic edema bilaterally.  No cyanosis or clubbing.  Good  distal perfusion.    LABORATORY DATA:  Sodium 137, potassium 3.1, bicarbonate 28, BUN 20, creatinine 2.5.  Troponin 426.  WBC 6.1, hemoglobin 10.5, platelets 183.  RSV is positive.    EKG shows atrial flutter with a heart rate of 109.    Chest x-ray shows slight patchy infiltrate.    ASSESSMENT:    1.   Acute respiratory syncytial virus pneumonia.  2.   End-stage renal disease, on hemodialysis.  3.   Elevated troponins.  This is a chronic elevation and was high 2 weeks ago.  Probably represents demand ischemia, type 2 myocardial infarction.    4.   Normal nuclear stress test and echocardiogram during recent weeks.  5.   Persistent atrial fibrillation/flutter.  Rate mildly increased secondary to acute illness.  The patient is on Eliquis for stroke prevention.    PLAN:    1.   Draw 1 more troponin to document MI, lack of excessive rise.  2.   No additional rate control medications necessary, if rate stays below 110.  If consistently above that, could give doses of IV or p.o. diltiazem until acute illness resolves.    Thank you for this consultation.    Dictated By Micha Clark MD  d: 12/02/2024 17:19:33  t: 12/02/2024 17:31:10  [facility] [id number]  [facility] [place]
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
## 2024-12-04 NOTE — PLAN OF CARE
Discharge order in place; AVS discussed with patient, no question/concerns. Daughter to transport home.   Problem: Patient Centered Care  Goal: Patient preferences are identified and integrated in the patient's plan of care  Description: Interventions:  - What would you like us to know as we care for you? From home with daughter   - Provide timely, complete, and accurate information to patient/family  - Incorporate patient and family knowledge, values, beliefs, and cultural backgrounds into the planning and delivery of care  - Encourage patient/family to participate in care and decision-making at the level they choose  - Honor patient and family perspectives and choices  Outcome: Adequate for Discharge     Problem: Diabetes/Glucose Control  Goal: Glucose maintained within prescribed range  Description: INTERVENTIONS:  - Monitor Blood Glucose as ordered  - Assess for signs and symptoms of hyperglycemia and hypoglycemia  - Administer ordered medications to maintain glucose within target range  - Assess barriers to adequate nutritional intake and initiate nutrition consult as needed  - Instruct patient on self management of diabetes  Outcome: Adequate for Discharge     Problem: Patient/Family Goals  Goal: Patient/Family Long Term Goal  Description: Patient's Long Term Goal: discharge    Interventions:  - cardiology consult, monitor vitals, monitor labs,   - See additional Care Plan goals for specific interventions  Outcome: Adequate for Discharge  Goal: Patient/Family Short Term Goal  Description: Patient's Short Term Goal: feel better     Interventions:   - nebs per orders, cardiology consult, monitor labs, monitor vitals  - See additional Care Plan goals for specific interventions  Outcome: Adequate for Discharge     Problem: CARDIOVASCULAR - ADULT  Goal: Maintains optimal cardiac output and hemodynamic stability  Description: INTERVENTIONS:  - Monitor vital signs, rhythm, and trends  - Monitor for bleeding,  hypotension and signs of decreased cardiac output  - Evaluate effectiveness of vasoactive medications to optimize hemodynamic stability  - Monitor arterial and/or venous puncture sites for bleeding and/or hematoma  - Assess quality of pulses, skin color and temperature  - Assess for signs of decreased coronary artery perfusion - ex. Angina  - Evaluate fluid balance, assess for edema, trend weights  Outcome: Adequate for Discharge  Goal: Absence of cardiac arrhythmias or at baseline  Description: INTERVENTIONS:  - Continuous cardiac monitoring, monitor vital signs, obtain 12 lead EKG if indicated  - Evaluate effectiveness of antiarrhythmic and heart rate control medications as ordered  - Initiate emergency measures for life threatening arrhythmias  - Monitor electrolytes and administer replacement therapy as ordered  Outcome: Adequate for Discharge     Problem: RESPIRATORY - ADULT  Goal: Achieves optimal ventilation and oxygenation  Description: INTERVENTIONS:  - Assess for changes in respiratory status  - Assess for changes in mentation and behavior  - Position to facilitate oxygenation and minimize respiratory effort  - Oxygen supplementation based on oxygen saturation or ABGs  - Provide Smoking Cessation handout, if applicable  - Encourage broncho-pulmonary hygiene including cough, deep breathe, Incentive Spirometry  - Assess the need for suctioning and perform as needed  - Assess and instruct to report SOB or any respiratory difficulty  - Respiratory Therapy support as indicated  - Manage/alleviate anxiety  - Monitor for signs/symptoms of CO2 retention  Outcome: Adequate for Discharge

## 2024-12-04 NOTE — PLAN OF CARE
Troy is A&Ox4 and on room air. Monitoring blood glucose levels per order- ACHS, saline locked, voiding independently, tolerating diet, no complaints of pain. Frequent rounding by nursing staff. Safety precautions maintained/call light within reach.       Problem: Patient Centered Care  Goal: Patient preferences are identified and integrated in the patient's plan of care  Description: Interventions:  - What would you like us to know as we care for you?   - Provide timely, complete, and accurate information to patient/family  - Incorporate patient and family knowledge, values, beliefs, and cultural backgrounds into the planning and delivery of care  - Encourage patient/family to participate in care and decision-making at the level they choose  - Honor patient and family perspectives and choices  Outcome: Progressing     Problem: Diabetes/Glucose Control  Goal: Glucose maintained within prescribed range  Description: INTERVENTIONS:  - Monitor Blood Glucose as ordered  - Assess for signs and symptoms of hyperglycemia and hypoglycemia  - Administer ordered medications to maintain glucose within target range  - Assess barriers to adequate nutritional intake and initiate nutrition consult as needed  - Instruct patient on self management of diabetes  Outcome: Progressing     Problem: CARDIOVASCULAR - ADULT  Goal: Maintains optimal cardiac output and hemodynamic stability  Description: INTERVENTIONS:  - Monitor vital signs, rhythm, and trends  - Monitor for bleeding, hypotension and signs of decreased cardiac output  - Evaluate effectiveness of vasoactive medications to optimize hemodynamic stability  - Monitor arterial and/or venous puncture sites for bleeding and/or hematoma  - Assess quality of pulses, skin color and temperature  - Assess for signs of decreased coronary artery perfusion - ex. Angina  - Evaluate fluid balance, assess for edema, trend weights  Outcome: Progressing  Goal: Absence of cardiac arrhythmias or  at baseline  Description: INTERVENTIONS:  - Continuous cardiac monitoring, monitor vital signs, obtain 12 lead EKG if indicated  - Evaluate effectiveness of antiarrhythmic and heart rate control medications as ordered  - Initiate emergency measures for life threatening arrhythmias  - Monitor electrolytes and administer replacement therapy as ordered  Outcome: Progressing     Problem: RESPIRATORY - ADULT  Goal: Achieves optimal ventilation and oxygenation  Description: INTERVENTIONS:  - Assess for changes in respiratory status  - Assess for changes in mentation and behavior  - Position to facilitate oxygenation and minimize respiratory effort  - Oxygen supplementation based on oxygen saturation or ABGs  - Provide Smoking Cessation handout, if applicable  - Encourage broncho-pulmonary hygiene including cough, deep breathe, Incentive Spirometry  - Assess the need for suctioning and perform as needed  - Assess and instruct to report SOB or any respiratory difficulty  - Respiratory Therapy support as indicated  - Manage/alleviate anxiety  - Monitor for signs/symptoms of CO2 retention  Outcome: Progressing

## 2024-12-04 NOTE — PROGRESS NOTES
Troy Gustafson Sr. Patient Status:  Inpatient    3/11/1970 MRN [id number]   Location [facility]5W Attending Anna Alex MD   Hosp Day # 2 PCP None Pcp        SUBJECTIVE:Feels better.  Rare cough with clear sputum.  Off O2.  Ambulated yesterday without difficulty.  Wants to go home today.   OBJECTIVE:  Patient Vitals for the past 24 hrs:   BP Temp Temp src Pulse Resp SpO2   24 0333 159/79 98.2 °F (36.8 °C) Oral 62 18 93 %   24 2349 111/73 98.3 °F (36.8 °C) Oral 69 18 94 %   24 130/70 98.1 °F (36.7 °C) Oral 62 20 95 %   24 1656 131/76 98.1 °F (36.7 °C) Oral 63 22 90 %   24 1245 -- -- -- -- -- 97 %   24 1242 -- -- -- -- -- 97 %   24 1239 132/74 98.4 °F (36.9 °C) Oral 61 20 96 %   24 0835 -- -- -- -- -- 96 %   24 0832 155/89 98.4 °F (36.9 °C) Oral 71 18 97 %     O2 requirement: RA     I/O last 3 completed shifts:  In: 960 [P.O.:940; I.V.:20]  Out: 550 [Urine:550]  No intake/output data recorded.    Medications:  Reviewed in EMR.     Physical Exam:   General: no distress   Lungs: clear bilaterally   Heart: Regular rate and rhythm, normal S1S2, no murmur.   Extremity: no edema, no cyanosis         Lab Results   Component Value Date    CREATSERUM 4.36 2024    BUN 58 2024     2024    K 3.3 2024    CL 98 2024    CO2 28.0 2024     2024    CA 8.6 2024    PGLU 321 2024          Imaging: Reviewed.     ASSESSMENT/PLAN:     Acute hypoxic respiratory failure:  secondary to RSV pneumonia  - wean O2  - s/p IV steroids in ED, no indication to continue at this time  - albuterol metanebs  - IS, flutter  ID:  RSV pneumonia, clinically improving, no evidence of bacterial superinfection at this time  - monitor off abx  - f/u CXR 2 months  - supportive care  Abnormal CXR:  RLL infiltrate in setting of RSV pneumonia, very small R pleural effusion which was previously larger and may be due to  ESRD  - fluid management per renal  - outpt follow up CXR 2 months  CV:  AFib with RVR, CAD, elevated troponin  - per cardiology  Suspected SRINIVASAN  - outpt PSG  Proph  - NOAC  Dispo  - OK for home after HD today from pulmonary standpoint, follow up Dr. Nuno 1-2 weeks  Micha Fine MD  12/4/2024  7:10 AM

## 2024-12-04 NOTE — CM/SW NOTE
12/04/24 0800   NATHANIEL/LEYDA Referral Data   Referral Source    Reason for Referral Readmission;Discharge planning   Informant Patient;Daughter   Readmission Assessment   Factors that patient feels contributed to this readmission Acute/Chronic Clinical Presentation   Pt's living situation prior to admission? Home with family   Pt's level of independence at discharge? No assist/independent (minimal)   Pt. received education on diagnoses at time of discharge? Yes   Did you know who and how to call someone if you felt worse? Yes   Did any new symptoms or issues develop after you were discharged? Yes   ----Post D/C symptoms: Symptoms/issue related to previous hospitalization No   Did you understand your discharge instructions? Yes   Were medications taken as indicated on discharge instructions? Yes   Did you have a follow-up appointment scheduled at time of discharge? Yes   ----F/U appt: Did you go to that appointment? Yes   ---- F/U appt: How many days after discharge was follow-up appointment? 0-14 days   Was full assessment completed by LEYDA/NATHANIEL on prior admission? Yes   Was the recommended discharge plan achieved? Yes   Was pt. discharged w/out services? No   Medical Hx   Does patient have an established PCP? No   Patient Info   Patient's Current Mental Status at Time of Assessment Alert;Oriented   Patient's Home Environment House   Number of Levels in Home 1   Patient lives with Alone   Patient Status Prior to Admission   Independent with ADLs and Mobility Yes   Services in place prior to admission Dialysis   Dialysis Clinic [facility]  ([place])   Scheduled Dialysis days M-W-F   Dialysis scheduled time 0600   Discharge Needs   Anticipated D/C needs Outpatient dialysis     Pt discussed during nursing rounds. Dx acute bronchitis, RSV on RA, ESRD on HD. Home alone, independent w/o device prior to admission. Pt drives and works full time. Current w/[facility] for outpatient HD, MWF at 6am.  No  additional home care needs anticipated at [place].    Plan: Home alone pending medical clearance.    / to remain available for support and/or discharge planning.     Ross Price, QUINNN    [phone]

## 2024-12-04 NOTE — PROGRESS NOTES
Progress Note  Troy Gustafson Sr. Patient Status:  Inpatient    3/11/1970 MRN [id number]   Location [facility]5W Attending Tom Jang DO   Hosp Day # 2 PCP None Pcp     SUBJECTIVE:    Reports dry, non productive cough. Appetite has improved.     VITALS:  /79 (BP Location: Right arm)   Pulse 63   Temp 97.8 °F (36.6 °C) (Oral)   Resp 19   Ht 6' 2\" (1.88 m)   Wt 260 lb 6.4 oz (118.1 kg)   SpO2 94%   BMI 33.43 kg/m²     INTAKE/OUTPUT:    Intake/Output Summary (Last 24 hours) at 2024 0829  Last data filed at 2024 0203  Gross per 24 hour   Intake 350 ml   Output 550 ml   Net -200 ml     Last 3 Weights   24 1753 260 lb 6.4 oz (118.1 kg)   24 1356 260 lb (117.9 kg)   24 0503 256 lb 14.4 oz (116.5 kg)   24 0500 258 lb 14.4 oz (117.4 kg)   24 0910 265 lb (120.2 kg)   24 0545 270 lb (122.5 kg)   24 0618 264 lb 4.8 oz (119.9 kg)   24 0514 279 lb 12.8 oz (126.9 kg)   24 0300 289 lb 4.8 oz (131.2 kg)   24 0600 292 lb 1.6 oz (132.5 kg)   11/10/24 1831 294 lb (133.4 kg)   11/10/24 1237 (!) 304 lb 7.3 oz (138.1 kg)   11/10/24 1207 265 lb (120.2 kg)   24 1128 275 lb (124.7 kg)       LABS:  Recent Labs   Lab 24  1506 24  0445 24  0520   * 242* 184*   BUN 20 35* 58*   CREATSERUM 2.52* 3.63* 4.36*   EGFRCR 29* 19* 15*   CA 8.5* 8.7 8.6*    135* 135*   K 3.1* 4.0 3.3*    99 98   CO2 28.0 29.0 28.0     Recent Labs   Lab 24  1506   RBC 3.92*   HGB 10.5*   HCT 33.2*   MCV 84.7   MCH 26.8   MCHC 31.6   RDW 13.5   NEPRELIM 4.22   WBC 6.1   .0     No results for input(s): \"TROP\", \"CK\" in the last 168 hours.    DIAGNOSTICS:    TELEMETRY: Aflutter CVR    ECHO 2024:  Conclusions:   1. Left ventricle: The cavity size was normal. Wall thickness was mildly      increased. Systolic function was normal. The estimated ejection fraction      was 60-65%, by visual assessment. Wall motion is  normal; there are no      regional wall motion abnormalities. Unable to assess LV diastolic      function due to heart rhythm.   2. Mitral valve: There was mild regurgitation.   3. Left atrium: The atrium was mildly dilated.   Impressions:  No significant change since prior study.     ROS: Negative unless noted above     PHYSICAL EXAM:  General: Alert and oriented x 3. No apparent distress.  HEENT: Normocephalic, sclera are nonicteric. Hearing appropriate bilaterally.  Neck: No JVD or Carotid bruits. Trachea midline.   Cardiac: Regular rate and rhythm. S1, S2 auscultated. No murmurs, rubs, or gallops appreciated.   Lungs: Clear without wheezes, rales, rhonchi or dullness. Chest expansion symmetrical. Regular effort.  Abdomen: Soft, non-tender, +BS. No hepatosplenomegaly or appreciable masses.   Extremities: Without clubbing, cyanosis or edema. Peripheral pulses are 2+.  Neurologic: Motor and sensory nerves grossly intact.   Psych: Appropriate affect   Skin: Warm and dry. +HD cath     MEDICATIONS:   albuterol  2.5 mg Nebulization Q6H WA    insulin degludec  10 Units Subcutaneous Daily    insulin aspart  3 Units Subcutaneous TID CC    insulin aspart  1-5 Units Subcutaneous TID CC    amLODIPine  10 mg Oral Daily    carvedilol  12.5 mg Oral BID with meals    furosemide  40 mg Oral Daily    hydrALAZINE  25 mg Oral BID    isosorbide dinitrate  20 mg Oral TID (Nitrates)    rosuvastatin  5 mg Oral Nightly    apixaban  5 mg Oral BID     ASSESSMENT:    Presented with anorexia, dry cough, and chills. Admitted for RSV treatment. Cardiology was consulted for elevated troponin.    Acute RSV PNA  - Pulm following     Elevated Troponin   - Normal nuclear and ECHO 7/2024  - Chronically elevated, flat rise this stay   - No ACS symptoms, likely demand ischemia     PAF/Flutter  - A/c with Eliquis   - Rates controlled with Coreg    HTN  - Historically variable BPS  - Reasonably controlled on Hydralazine, Coreg, Amlodipine, Isordil      ESRD on HD, MWF  - Nephrology following     HLD- LDL 88, Crestor 5 mg     PLAN:  - With ESRD and elevated LDL will switch Crestor to Lipitor, repeat lipid panel in 3 months o/p   - Otherwise appears stable for discharge from a cardiology standpoint. Will plan to have a CCTA outpatient, this can be deferred due to lack of ischemic symptoms, primary cardiologist would also like patient to recover from acute illness, patient agreeable     Plan of care discussed with patient and RN.     Marry Lord, ALBERTO  12/4/2024  8:29 AM  [phone] ([place])  [phone] ([place])

## 2024-12-04 NOTE — PROGRESS NOTES
[facility]  part of [facility]    Progress Note    Troy Gustafson Sr. Patient Status:  Inpatient    3/11/1970 MRN [id number]   Location [facility]5W Attending No att. providers found   Hosp Day # 2 PCP None Pcp       Subjective:   Troy Gustafson Sr. is a(n) 54 year old male     ROS:     Constitutional:  Negative for decreased activity, fever, irritability and lethargy  ENMT:  Negative for ear drainage, hearing loss and nasal drainage  Eyes:  Negative for eye discharge and vision loss  Cardiovascular:  Negative for chest pain, sob, irregular heartbeat/palpitations  Respiratory: Breathing improved  Gastrointestinal:  Negative for abdominal pain, constipation, decreased appetite, diarrhea and vomiting  Genitourinary:  Negative for dysuria and hematuria  Endocrine:  Negative for abnormal sleep patterns, increased activity, polydipsia and polyphagia  Hema/Lymph:  Negative for easy bleeding and easy bruising  Integumentary:  Negative for pruritus and rash  Musculoskeletal:  Negative for bone/joint symptoms  Neurological:  Negative for gait disturbance  Psychiatric:  Negative for inappropriate interaction and psychiatric symptoms      Vitals:    24 1237   BP: 126/71   Pulse: 62   Resp: 18   Temp: 98.1 °F (36.7 °C)           PHYSICAL EXAM:   Constitutional: appears well hydrated alert and responsive no acute distress noted  Head/Face: normocephalic  Eyes/Vision: normal extraocular motion is intact  Nose/Mouth/Throat:mucous membranes are moist and no oral lesions are noted  Neck/Thyroid: neck is supple without adenopathy  Lymphatic: no abnormal cervical, supraclavicular adenopathy is noted  Respiratory:  lungs are clear to auscultation bilaterally, normal respiratory effort  Cardiovascular: regular rate and rhythm no murmurs, gallups, or rubs  Abdomen: soft, non-tender, non-distended, BS normal  Vascular: well perfused femoral, and pedal pulses normal  Skin/Hair: no unusual rashes  present, no abnormal bruising noted  Back/Spine: no abnormalities noted  Musculoskeletal: full ROM all extremities good strength  no deformities  Extremities: no edema, cyanosis  Neurological:  Grossly normal    Results:     Laboratory Data:  Lab Results   Component Value Date    WBC 6.1 12/02/2024    HGB 10.5 (L) 12/02/2024    HCT 33.2 (L) 12/02/2024    .0 12/02/2024    CREATSERUM 4.36 (H) 12/04/2024    BUN 58 (H) 12/04/2024     (L) 12/04/2024    K 3.3 (L) 12/04/2024    CL 98 12/04/2024    CO2 28.0 12/04/2024     (H) 12/04/2024    CA 8.6 (L) 12/04/2024    ALB 3.6 11/19/2024    ALKPHO 108 07/21/2024    ALKPHO 110 07/21/2024    BILT 0.5 07/21/2024    BILT 0.4 07/21/2024    TP 6.7 07/21/2024    TP 6.5 07/21/2024    AST 22 11/13/2024    ALT 68 (H) 11/13/2024    PTT 42.5 (H) 11/15/2024    TSH 2.741 11/13/2024    MG 2.1 11/17/2024    PHOS 5.1 11/19/2024     (H) 07/23/2024       Imaging:  [unfilled]   No results found.      ASSESSMENT/PLAN:   Assessment       1 respiratory infection RSV stable responded to steroids  Was sent home on albuterol inhaler     #2 ESRD had dialysis today with get done again on Friday    Follow-up as outpatient  12/4/2024  Ross Jackson MD

## 2024-12-04 NOTE — DISCHARGE SUMMARY
General Medicine Discharge Summary     Patient ID:  Troy Gustafson SrJuanita  54 year old  3/11/1970    Admit date: 12/2/2024    Discharge date and time: 12/4/24    Attending Physician: Tom Jang DO     Primary Care Physician: None Pcp     Discharge Diagnoses:   Acute bronchiolitis due to respiratory syncytial virus (RSV) [J21.0]  Pleural effusion [J90]  Hypoxia [R09.02]  Elevated troponin [R79.89]  ESRD  Atrial fibrillation   Hypertension   Type 2 diabetes    Discharge Condition: stable    Disposition: home     Important Follow up:    Primary care provider    Follow up  would follow up with primary care provider in 1 week      Hospital Course:    54 year old male with PMH sig for type 2 DM on 70/30 with admission a few weeks ago for NSTEMI, afib with RVR, poorly controlled DM, worsening CKD started on HD who now presented with worsening fatigue, fever and hypoxia.     Hypoxia- improved  Fever  RSV  - started on steroids in ER, did not need  to continue  - pulmonary consulted  - weaned O2   - supportive cares. Can have repeat CXR in 2 months     CKD5 -> ESRD  - started on HD last admission  - renal consult, HD MWF     Afib  - continue carvedilol and Eliquis     Elevated troponin  Recent NSTEMI  - [facility] consulted  - TTE with normal EF, no WMA  - recent stress test negative   - likely CCTA outpatient   - crestor switched to lipitor per cardiology, given ESRD    Type 2 DM  -last A1c 9.9 in 7/2024  -per pt takes 70/30 12 units BID     HTN  - BP elevated in ER  - continue norvasc, carvediolol, isosorbide, hydralazine    Consults:   IP CONSULT TO HOSPITALIST  IP CONSULT TO PULMONOLOGY  IP CONSULT TO CARDIOLOGY  IP CONSULT TO NEPHROLOGY    Patient instructions:      Current Discharge Medication List        START taking these medications    Details   atorvastatin 20 MG Oral Tab Take 1 tablet (20 mg total) by mouth nightly.      albuterol 108 (90 Base) MCG/ACT Inhalation  Aero Soln Inhale 1-2 puffs into the lungs every 4 (four) hours as needed for Wheezing.           CONTINUE these medications which have CHANGED    Details   hydrALAZINE 25 MG Oral Tab Take 1 tablet (25 mg total) by mouth in the morning and 1 tablet (25 mg total) before bedtime.           CONTINUE these medications which have NOT CHANGED    Details   furosemide 40 MG Oral Tab Take 1 tablet (40 mg total) by mouth daily.      isosorbide dinitrate 20 MG Oral Tab Take 1 tablet (20 mg total) by mouth TID (Nitrates).      apixaban 2.5 MG Oral Tab Take 1 tablet (2.5 mg total) by mouth 2 (two) times daily.      carvedilol 12.5 MG Oral Tab Take 1 tablet (12.5 mg total) by mouth 2 (two) times daily with meals.      amLODIPine 10 MG Oral Tab Take 1 tablet (10 mg total) by mouth daily.      Insulin NPH & Regular (NOVOLIN 70/30 FLEXPEN RELION) (70-30) 100 UNIT/ML Subcutaneous Suspension Pen-injector Test blood glucose before breakfast and dinner Inject 12 units 30 minutes before breakfast daily Inject 12 units 30 minutes before dinner daily      Glucose Blood (ONETOUCH VERIO) In Vitro Strip Use to check blood sugars twice daily      Insulin Pen Needle (BD PEN NEEDLE MIKEY U/F) 32G X 4 MM Does not apply Misc Use a new pen needle with each injection as directed by your doctor      OneTouch Delica Lancets 33G Does not apply Misc Use to check blood sugar twice daily           STOP taking these medications       rosuvastatin 5 MG Oral Tab            Code Status: Full Code    Exam on day of discharge:     Vitals:    12/04/24 0803   BP:    Pulse: 63   Resp: 19   Temp: 97.8 °F (36.6 °C)   General: no acute distress  Heart: RRR  Lungs: clear bilaterally  Abdomen: nontender  Extremities: no pedal edema     Total time coordinating care for discharge: Greater than 30 minutes    Tom Jang DO

## 2024-12-05 NOTE — PROGRESS NOTES
BECK s/w patient who stated that he would call [facility] back. [facility] will try again another time if no cb is received.

## 2024-12-06 NOTE — PROGRESS NOTES
NCM attempted to reach the patient to complete a Hospital follow up call. Left message to call back. [facility] provided direct contact info at [phone].
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
## 2024-12-20 NOTE — PROGRESS NOTES
Multiple attempts to reach the pt and messages left with no returned phone call. Ptast recommended HFU timeframe, closing encounter.

## (undated) NOTE — LETTER
To Whom It May Concern:    Sj Simmons Sr. has been hospitalized 7/21/2024-7/25/2024. His daughter, Yelitza, has been visiting in the hospital to help take care of him during this time.        Please feel free to contact us if there are any questions.      Sincerely,        [facility] 3W/SW  [address] [facility] [address]  [phone]        Document generated by:  Verito WONG

## (undated) NOTE — LETTER
July 25, 2024      Sj Simmons Sr.  [address] [facility] [address]      Dear Sj:    You recently had a Cardiolite stress test, ordered by ***.  We are pleased to inform you that your results are normal.  A normal result would include: no scan evidence of ischemia, no scan evidence of myocardial infarction, normal wall motion of the the heart, and a normal ejection fraction.    If you have any questions about these results, you may contact one of the nurses at our office.    [facility] Cardiology

## (undated) NOTE — LETTER
[place] [address]  Authorization for Invasive Procedures  Date: 11-           Time: 1500    I hereby authorize Issac or MD Cristel, my physician and his/her assistants (if applicable), which may include medical students, residents, and/or fellows, to perform the following surgical operation/ procedure and administer such anesthesia as may be determined necessary by my physician: Tunneled Dialysis Catheter Insertion on Troykasia Gustafson Sr.  2.   I recognize that during the surgical operation/procedure, unforeseen conditions may necessitate additional or different procedures than those listed above.  I, therefore, further authorize and request that the above-named surgeon, assistants, or designees perform such procedures as are, in their judgment, necessary and desirable.    3.   My surgeon/physician has discussed prior to my surgery the potential benefits, risks and side effects of this procedure; the likelihood of achieving goals; and potential problems that might occur during recuperation.  They also discussed reasonable alternatives to the procedure, including risks, benefits, and side effects related to the alternatives and risks related to not receiving this procedure.  I have had all my questions answered and I acknowledge that no guarantee has been made as to the result that may be obtained.    4.   Should the need arise during my operation/procedure, which includes change of level of care prior to discharge, I also consent to the administration of blood and/or blood products.  Further, I understand that despite careful testing and screening of blood or blood products by collecting agencies, I may still be subject to ill effects as a result of receiving a blood transfusion and/or blood products.  The following are some, but not all, of the potential risks that can occur: fever and allergic reactions, hemolytic reactions, transmission of diseases such as Hepatitis, AIDS and  Cytomegalovirus (CMV) and fluid overload.  In the event that I wish to have an autologous transfusion of my own blood, or a directed donor transfusion, I will discuss this with my physician.   Check only if Refusing Blood or Blood Products  I understand refusal of blood or blood products as deemed necessary by my physician may have serious consequences to my condition to include possible death. I hereby assume responsibility for my refusal and release the hospital, its personnel, and my physicians from any responsibility for the consequences of my refusal.         o  Refuse         5.   I authorize the use of any specimen, organs, tissues, body parts or foreign objects that may be removed from my body during the operation/procedure for diagnosis, research or teaching purposes and their subsequent disposal by hospital authorities.  I also authorize the release of specimen test results and/or written reports to my treating physician on the hospital medical staff or other referring or consulting physicians involved in my care, at the discretion of the Pathologist or my treating physician.    6.   I consent to the photographing or videotaping of the operations or procedures to be performed, including appropriate portions of my body for medical, scientific, or educational purposes, provided my identity is not revealed by the pictures or by descriptive texts accompanying them.  If the procedure has been photographed/videotaped, the surgeon will obtain the original picture, image, videotape or CD.  The hospital will not be responsible for storage, release or maintenance of the picture, image, tape or CD.    7.   I consent to the presence of a  or observers in the operating room as deemed necessary by my physician or their designees.    8.   I recognize that in the event my procedure results in extended X-Ray/fluoroscopy time, I may develop a skin reaction.    9. If I have a Do Not Attempt Resuscitation  (DNAR) order in place, that status will be suspended while in the operating room, procedural suite, and during the recovery period unless otherwise explicitly stated by me (or a person authorized to consent on my behalf). The surgeon or my attending physician will determine when the applicable recovery period ends for purposes of reinstating the DNAR order.  10. Patients having a sterilization procedure: I understand that if the procedure is successful the results will be permanent and it will therefore be impossible for me to inseminate, conceive, or bear children.  I also understand that the procedure is intended to result in sterility, although the result has not been guaranteed.   11. I acknowledge that my physician has explained sedation/analgesia administration to me including the risk and benefits I consent to the administration of sedation/analgesia as may be necessary or desirable in the judgment of my physician.    I CERTIFY THAT I HAVE READ AND FULLY UNDERSTAND THE ABOVE CONSENT TO OPERATION and/or OTHER PROCEDURE.        ____________________________________       _________________________________      ______________________________  Signature of Patient         Signature of Responsible Person        Printed Name of Responsible Person        ____________________________________      _________________________________      ______________________________       Signature of Witness          Relationship to Patient                       Date                                       Time  Patient Name: Troy Gustafson   : 3/11/1970    Reviewed: 2024   Printed: 2024  Medical Record #: [id number] Page 1 of 2             STATEMENT OF PHYSICIAN My signature below affirms that prior to the time of the procedure; I have explained to the patient and/or his/her legal representative, the risks and benefits involved in the proposed treatment and any reasonable alternative to the proposed  treatment. I have also explained the risks and benefits involved in refusal of the proposed treatment and alternatives to the proposed treatment and have answered the patient's questions. If I have a significant financial interest in a co-management agreement or a significant financial interest in any product or implant, or other significant relationship used in this procedure/surgery, I have disclosed this and had a discussion with my patient.     _______________________________________________________________ _____________________________  (Signature of Physician)                                                                                         (Date)                                   (Time)  Patient Name: Troy Gustafson   : 3/11/1970    Reviewed: 2024   Printed: 2024  Medical Record #: [id number] Page 2 of 2

## (undated) NOTE — LETTER
[facility]  [address]  [place], [place] [address]  ?  07/25/24  ?  Re: Sj Simmons Sr.  ?  To Whom It May Concern:    Sj Lazokasia العراقي was admitted to [facility] from 07/21/24 to 07/25/24.    Please excuse Sj Simmons Sr. from attending work for these days.    The patient may return to work on 07/26/2024 without restrictions.    Patient will follow up with their primary care physician upon discharge.   Further restrictions and work excuse will be based on primary care physician's evaluation.  ?  Thank you,    PANKAJ PAN MD   [facility]

## (undated) NOTE — LETTER
[place] [address]    Consent for Diagnostic Services    Your physician has ordered one of the following tests to determine the function of your heart: Nuclear Treadmill (Sestamibi). The information obtained will aid your physician in detecting the possible presence of heart disease, to determine why you may have such symptoms such as chest pain or shortness of breath and to determine an appropriate plan of medical management.    The risks associated with any exercise test or pharmacological stress test are similar to the risks associated with exercise, including an abnormal blood pressure, dizziness, fainting, abnormal heart rate and in very rear instances heart attach, stroke, or death. During the test you must report any unusual feeling or symptoms you experience during the test. Every effort will be made to minimize such risks by careful observation during the test. Emergency equipment and trained personnel are available to deal with unusual situations, which may arise and these personnel have permission to treat you.     During the treadmill or nuclear stress test, the exercise intensity begins at a low level and advances in stages depending on your fitness level. We may stop the test at any time because of signs of fatigue or changes in your heart rate, electrocardiogram, or blood pressure, or other symptoms you may experience.     If your doctor has ordered a pharmacological stress test, you may experience a headache, shortness of breath, flushing, warmth, rapid heart rate, or a bitter taste in your mouth. Sometimes you may not experience any symptoms. Your prompt reporting of any symptoms is very important.     During a Regadenoson stress test, you are given an injection of Regadenoson. Immediately after the Regadenoson is given, you will be injected with a radioactive isotope. During a Dobutamine stress test, Dobutamine infuses over approximately fifteen minutes. A radioactive  isotope is injected during the infusion. After either pharmacological stress test, you will have either a nuclear scan or an echocardiogram.    The information that is obtained during your testing will be treated as privileged and confidential. The information obtained will be given to your doctor and may be used for insurance purposes or to track and trend data as part of  with your privacy retained.    I have read and understand the above information. I voluntarily agree and consent to the above ordered test. Furthermore, no guarantees or assurances have been given by anyone as to the results that may be obtained from this procedure. Any questions that may have occurred to me have been answered to my satisfaction.     Signature of Patient:   ____________________________________________________________    Signature of Witness: _______________________________Date: ___________Time: ________

## (undated) NOTE — LETTER
2024      Troy Gustafson   [address] [facility] [address]  Patient: Troy Gustafson SrJuanita  : 3/11/1970    To Whom It May Concern,     Troy Cotton Sr. was hospitalized from 24 to 24. Please excuse him from this time. He should be medically stable to return to work on 24. Thank you.     Best Regards,      Tom Iglesias Internal Medicine Hospitalist     [facility]  [address] [facility] [address]  [phone]